# Patient Record
Sex: FEMALE | Race: WHITE | Employment: FULL TIME | URBAN - METROPOLITAN AREA
[De-identification: names, ages, dates, MRNs, and addresses within clinical notes are randomized per-mention and may not be internally consistent; named-entity substitution may affect disease eponyms.]

---

## 2017-04-05 ENCOUNTER — EMPLOYEE WELLNESS (OUTPATIENT)
Dept: OTHER | Age: 32
End: 2017-04-05

## 2017-04-05 LAB
CHOLESTEROL, TOTAL: 107 MG/DL (ref 0–199)
GLUCOSE BLD-MCNC: 82 MG/DL (ref 70–99)
HDLC SERPL-MCNC: 58 MG/DL (ref 40–60)
LDL CHOLESTEROL CALCULATED: 40 MG/DL
TRIGL SERPL-MCNC: 43 MG/DL (ref 0–150)

## 2017-10-13 ENCOUNTER — TELEPHONE (OUTPATIENT)
Dept: FAMILY MEDICINE CLINIC | Age: 32
End: 2017-10-13

## 2017-10-13 DIAGNOSIS — Z00.00 PHYSICAL EXAM: Primary | ICD-10-CM

## 2017-10-25 DIAGNOSIS — Z00.00 PHYSICAL EXAM: ICD-10-CM

## 2017-10-25 LAB
A/G RATIO: 2 (ref 1.1–2.2)
ALBUMIN SERPL-MCNC: 4.7 G/DL (ref 3.4–5)
ALP BLD-CCNC: 79 U/L (ref 40–129)
ALT SERPL-CCNC: 13 U/L (ref 10–40)
ANION GAP SERPL CALCULATED.3IONS-SCNC: 14 MMOL/L (ref 3–16)
AST SERPL-CCNC: 15 U/L (ref 15–37)
BASOPHILS ABSOLUTE: 0.1 K/UL (ref 0–0.2)
BASOPHILS RELATIVE PERCENT: 1.1 %
BILIRUB SERPL-MCNC: 0.7 MG/DL (ref 0–1)
BUN BLDV-MCNC: 14 MG/DL (ref 7–20)
CALCIUM SERPL-MCNC: 9.3 MG/DL (ref 8.3–10.6)
CHLORIDE BLD-SCNC: 100 MMOL/L (ref 99–110)
CHOLESTEROL, TOTAL: 109 MG/DL (ref 0–199)
CO2: 27 MMOL/L (ref 21–32)
CREAT SERPL-MCNC: 0.6 MG/DL (ref 0.6–1.1)
EOSINOPHILS ABSOLUTE: 0.4 K/UL (ref 0–0.6)
EOSINOPHILS RELATIVE PERCENT: 4.2 %
GFR AFRICAN AMERICAN: >60
GFR NON-AFRICAN AMERICAN: >60
GLOBULIN: 2.3 G/DL
GLUCOSE BLD-MCNC: 94 MG/DL (ref 70–99)
HCT VFR BLD CALC: 41.2 % (ref 36–48)
HDLC SERPL-MCNC: 52 MG/DL (ref 40–60)
HEMOGLOBIN: 13.5 G/DL (ref 12–16)
LDL CHOLESTEROL CALCULATED: 49 MG/DL
LYMPHOCYTES ABSOLUTE: 1.9 K/UL (ref 1–5.1)
LYMPHOCYTES RELATIVE PERCENT: 20.2 %
MCH RBC QN AUTO: 30.8 PG (ref 26–34)
MCHC RBC AUTO-ENTMCNC: 32.9 G/DL (ref 31–36)
MCV RBC AUTO: 93.8 FL (ref 80–100)
MONOCYTES ABSOLUTE: 0.5 K/UL (ref 0–1.3)
MONOCYTES RELATIVE PERCENT: 5.7 %
NEUTROPHILS ABSOLUTE: 6.4 K/UL (ref 1.7–7.7)
NEUTROPHILS RELATIVE PERCENT: 68.8 %
PDW BLD-RTO: 13.5 % (ref 12.4–15.4)
PLATELET # BLD: 337 K/UL (ref 135–450)
PMV BLD AUTO: 8.5 FL (ref 5–10.5)
POTASSIUM SERPL-SCNC: 4.2 MMOL/L (ref 3.5–5.1)
RBC # BLD: 4.39 M/UL (ref 4–5.2)
SODIUM BLD-SCNC: 141 MMOL/L (ref 136–145)
TOTAL PROTEIN: 7 G/DL (ref 6.4–8.2)
TRIGL SERPL-MCNC: 42 MG/DL (ref 0–150)
TSH SERPL DL<=0.05 MIU/L-ACNC: 1.94 UIU/ML (ref 0.27–4.2)
VLDLC SERPL CALC-MCNC: 8 MG/DL
WBC # BLD: 9.4 K/UL (ref 4–11)

## 2017-11-01 ENCOUNTER — OFFICE VISIT (OUTPATIENT)
Dept: FAMILY MEDICINE CLINIC | Age: 32
End: 2017-11-01

## 2017-11-01 VITALS
HEIGHT: 68 IN | SYSTOLIC BLOOD PRESSURE: 122 MMHG | WEIGHT: 124.6 LBS | RESPIRATION RATE: 14 BRPM | BODY MASS INDEX: 18.88 KG/M2 | DIASTOLIC BLOOD PRESSURE: 74 MMHG | HEART RATE: 104 BPM | OXYGEN SATURATION: 94 %

## 2017-11-01 DIAGNOSIS — Z00.00 ANNUAL PHYSICAL EXAM: Primary | ICD-10-CM

## 2017-11-01 DIAGNOSIS — R82.90 ABNORMAL URINALYSIS: ICD-10-CM

## 2017-11-01 LAB
BILIRUBIN, POC: NEGATIVE
BLOOD URINE, POC: NORMAL
CLARITY, POC: NORMAL
COLOR, POC: YELLOW
GLUCOSE URINE, POC: NEGATIVE
KETONES, POC: NEGATIVE
LEUKOCYTE EST, POC: NORMAL
NITRITE, POC: POSITIVE
PH, POC: 7
PROTEIN, POC: 30
SPECIFIC GRAVITY, POC: 1.02
UROBILINOGEN, POC: 1

## 2017-11-01 PROCEDURE — 81002 URINALYSIS NONAUTO W/O SCOPE: CPT | Performed by: FAMILY MEDICINE

## 2017-11-01 PROCEDURE — 99395 PREV VISIT EST AGE 18-39: CPT | Performed by: FAMILY MEDICINE

## 2017-11-01 ASSESSMENT — PATIENT HEALTH QUESTIONNAIRE - PHQ9
1. LITTLE INTEREST OR PLEASURE IN DOING THINGS: 0
SUM OF ALL RESPONSES TO PHQ9 QUESTIONS 1 & 2: 0
2. FEELING DOWN, DEPRESSED OR HOPELESS: 0
SUM OF ALL RESPONSES TO PHQ QUESTIONS 1-9: 0

## 2017-11-01 NOTE — PROGRESS NOTES
bloating, or abdominal pain  Genitourinary: no urinary urgency, frequency, dysuria, nocturia, hesitancy, or incontinence  Musculoskeletal: no arthritis, arthralgia, myalgia, weakness, or morning stiffness  Neurologic: no paralysis, paresis, paresthesia, seizures, tremors, or headaches  Hematologic/Lymphatic/Immunologic: no anemia, abnormal bleeding/bruising, fever, chills, night sweats, swollen glands, or unexplained weight loss  Endocrine: no heat or cold intolerance and no polyphagia, polydipsia, or polyuria    PHYSICAL EXAMINATION:  /74 (Site: Left Arm, Position: Sitting, Cuff Size: Small Adult)   Pulse 104   Resp 14   Ht 5' 7.5\" (1.715 m)   Wt 124 lb 9.6 oz (56.5 kg)   SpO2 94%   BMI 19.23 kg/m²   General appearance: healthy, alert, no distress  Skin: Skin color, texture, turgor normal. No rashes or suspicious lesions. No induration or tightening palpated. Head: Normocephalic. No masses, lesions, tenderness or abnormalities  Eyes: Conjunctivae/corneas clear. PERRL, EOM's intact. Ears: External ears normal. Canals clear. TM's clear bilaterally. Hearing grossly normal.  Nose/Sinuses: Nares normal. Septum midline. Mucosa normal. No drainage or sinus tenderness. Oropharynx: Lips, mucosa, and tongue normal. Teeth and gums normal. Oropharynx clear with no exudate seen. Neck: Neck supple, and symmetric. No adenopathy. Thyroid symmetric, normal size, without nodule. Trachea is midline. No carotid bruits were noted. Back: Back symmetric, no curvature. ROM normal. No CVA tenderness. Lungs: Good diaphragmatic excursion. Lungs clear to auscultation bilaterally. No retractions or use of accessory muscles. Heart: PMI is not displaced, and no thrill noted. Regular rate and rhythm, with no rub, murmur or gallop noted. Breasts: Exam deferred to OB/GYN  Abdomen: Abdomen soft, non-tender. BS normal. No masses, organomegaly. No hernia noted.   Extremities: Extremities normal. No deformities, edema, or skin discoloration. No cyanosis or clubbing noted to the nails. Hands and feet were warm and well-perfused with palpable dorsalis pedis pulses bilaterally. Lymph: No lymphadenopathy of the neck or supraclavicular regions. Musculoskeletal: Spine ROM normal. Muscular strength intact. Neuro: Cranial nerves intact, gait normal. Reflexes normal and symmetric, with no pathologic reflex noted. No focal weakness. Normal sensation to light touch. Pelvic: Exam deferred to OB/GYN      UA:Results for Waleska Abraham (MRN [id number]) as of 11/1/2017 11:06   Ref. Range 11/1/2017 11:03   Protein, UA Unknown 30   Color, UA Unknown yellow   Clarity, UA Unknown cloudy   Glucose, UA POC Unknown negative   Bilirubin, UA Unknown negative   Ketones, UA Unknown negative   Spec Grav, UA Unknown 1.020   pH, UA Unknown 7.0   Urobilinogen, UA Unknown 1.0   Nitrite, UA Unknown positive   Leukocytes, UA Unknown small   Blood, UA POC Unknown trace         ASSESSMENT:   Complete physical without pap. 1. Annual physical exam    2. Abnormal urinalysis          PLAN:   1. All health maintenance issues were updated. 2. Recommend continue current medications, continue current healthy lifestyle patterns and return for routine annual checkups  3. Urine sent for culture.

## 2017-11-03 LAB
ORGANISM: ABNORMAL
URINE CULTURE, ROUTINE: ABNORMAL
URINE CULTURE, ROUTINE: ABNORMAL

## 2017-11-03 RX ORDER — CIPROFLOXACIN 500 MG/1
500 TABLET, FILM COATED ORAL 2 TIMES DAILY
Qty: 6 TABLET | Refills: 0 | Status: SHIPPED | OUTPATIENT
Start: 2017-11-03 | End: 2017-11-06

## 2018-03-13 ENCOUNTER — OFFICE VISIT (OUTPATIENT)
Dept: DERMATOLOGY | Age: 33
End: 2018-03-13

## 2018-03-13 DIAGNOSIS — D22.9 MULTIPLE NEVI: Primary | ICD-10-CM

## 2018-03-13 DIAGNOSIS — D22.5 IRRITATED NEVUS OF BACK: ICD-10-CM

## 2018-03-13 DIAGNOSIS — B35.4 TINEA CORPORIS: ICD-10-CM

## 2018-03-13 DIAGNOSIS — Z86.018 HISTORY OF DYSPLASTIC NEVUS: ICD-10-CM

## 2018-03-13 PROCEDURE — 99214 OFFICE O/P EST MOD 30 MIN: CPT | Performed by: DERMATOLOGY

## 2018-03-13 PROCEDURE — 11300 SHAVE SKIN LESION 0.5 CM/<: CPT | Performed by: DERMATOLOGY

## 2018-03-13 NOTE — PATIENT INSTRUCTIONS
Biopsy Wound Care Instructions    · Keep the bandage in place for 24 hours. · Cleanse the wound with mild soapy water daily   Gently dry the area.  Apply Vaseline or petroleum jelly to the wound using a cotton tipped applicator.  Cover with a clean bandage.  Repeat this process until the biopsy site is healed.  If you had stitches placed, continue treating the site until the stitches are removed. Remember to make an appointment to return to have your stitches removed by our staff.  You may shower and bathe as usual.       ** Biopsy results generally take around 7 business days to come back. If you have not heard from us by then, please call the office at [phone] between 8AM and 4PM Monday through Friday. Protecting Yourself From the Sun    · Apply broad spectrum water resistant sunscreen with an SPF of at least 30 to exposed areas of the skin. Dont forget the ears and lips! Remember to reapply sunscreen about every 2 hours and after swimming or sweating. · Wear sun protective clothing. Swim shirts (aka. rash guards) are a great idea and negates the need to reapply sunscreen in those areas.      · Seek the shade whenever possible especially between the hours of 10 am and 4 pm when the suns rays are the strongest.     · Avoid tanning beds

## 2018-03-13 NOTE — PROGRESS NOTES
following were examined and determined to be abnormal: LUE, back  trunk and extremities with scattered brown macules and papules   Largest on the lower abdomen - pinkish-brown  Scar on the back clear  Central back at bra line with soft pinkish-tan 5 mm papule  L wrist with erythematous finely scaly round patch    Assessment and Plan     1. Benign-appearing nevi and history of dysplastic nevus  2. Family history of melanoma  - educ re ABCD's of MM   educ sun protection   encouraged skin check yearly (sooner if indicated), self checks    3. Irritated nevus - central back at bra line  - Shave removal - 5 mm - performed after verbal consent obtained. Patient educated regarding risk of bleeding, infection, scar and educated on wound care. Skin cleansed with alcohol pad and site anesthetized with lido + epi. Aluminum chloride applied to site for hemostasis. Petrolatum ointment and bandage applied. Specimen bottle labeled with patient information and site and specimen sent to dermpath.       4. Tinea corporis - L wrist - KOH +  - luzu daily x 10 days then HC cream if persistent dry patch after luzu completed

## 2018-03-20 VITALS — BODY MASS INDEX: 20.4 KG/M2 | WEIGHT: 117 LBS

## 2018-03-27 ENCOUNTER — TELEPHONE (OUTPATIENT)
Dept: DERMATOLOGY | Age: 33
End: 2018-03-27

## 2018-05-11 ENCOUNTER — EMPLOYEE WELLNESS (OUTPATIENT)
Dept: OTHER | Age: 33
End: 2018-05-11

## 2018-05-11 LAB
CHOLESTEROL, TOTAL: 106 MG/DL (ref 0–199)
GLUCOSE BLD-MCNC: 86 MG/DL (ref 70–99)
HDLC SERPL-MCNC: 51 MG/DL (ref 40–60)
LDL CHOLESTEROL CALCULATED: 47 MG/DL
TRIGL SERPL-MCNC: 39 MG/DL (ref 0–150)

## 2018-05-21 VITALS — WEIGHT: 125 LBS | BODY MASS INDEX: 19.29 KG/M2

## 2019-04-04 LAB — TSH SERPL DL<=0.05 MIU/L-ACNC: 2.3 UIU/ML (ref 0.27–4.2)

## 2019-04-11 ENCOUNTER — OFFICE VISIT (OUTPATIENT)
Dept: FAMILY MEDICINE CLINIC | Age: 34
End: 2019-04-11
Payer: COMMERCIAL

## 2019-04-11 VITALS
DIASTOLIC BLOOD PRESSURE: 64 MMHG | HEART RATE: 81 BPM | OXYGEN SATURATION: 95 % | WEIGHT: 123 LBS | SYSTOLIC BLOOD PRESSURE: 110 MMHG | RESPIRATION RATE: 15 BRPM | BODY MASS INDEX: 18.64 KG/M2 | HEIGHT: 68 IN

## 2019-04-11 DIAGNOSIS — E01.0 THYROMEGALY: ICD-10-CM

## 2019-04-11 DIAGNOSIS — E01.0 THYROMEGALY: Primary | ICD-10-CM

## 2019-04-11 LAB — THYROID PEROXIDASE (TPO) ABS: 7 IU/ML

## 2019-04-11 PROCEDURE — 99213 OFFICE O/P EST LOW 20 MIN: CPT | Performed by: FAMILY MEDICINE

## 2019-04-11 ASSESSMENT — PATIENT HEALTH QUESTIONNAIRE - PHQ9
SUM OF ALL RESPONSES TO PHQ QUESTIONS 1-9: 0
2. FEELING DOWN, DEPRESSED OR HOPELESS: 0
1. LITTLE INTEREST OR PLEASURE IN DOING THINGS: 0
SUM OF ALL RESPONSES TO PHQ9 QUESTIONS 1 & 2: 0
SUM OF ALL RESPONSES TO PHQ QUESTIONS 1-9: 0

## 2019-04-11 NOTE — PROGRESS NOTES
Melina Sanchez is a 27 y.o. female. HPI:  Saw gyn last week, they noted her thyroid was enlarged. Pt had not noticed. Denies pain. No trouble breathing/swallowing. Denies symptoms of hypo/hyperthyroidism. Had TSH drawn, was normal.     ROS:  Gen:  Denies fever, chills, headaches. HEENT:  Denies cold symptoms, sore throat. CV:  Denies chest pain or tightness, palpitations. Pulm:  Denies shortness of breath, cough. Abd:  Denies abdominal pain, change in bowel habits. I have reviewed the patient's medical/surgical/family/social in detail and updated the computerized patient record as appropriate. No current outpatient medications on file. No current facility-administered medications for this visit.         Past Medical History:   Diagnosis Date    Heart abnormalities age 8    benign heart murmur    Varicosities     vulvar variscosities     Past Surgical History:   Procedure Laterality Date    WISDOM TOOTH EXTRACTION  age 12     Family History   Problem Relation Age of Onset    High Cholesterol Paternal Uncle     Cancer Maternal Grandmother 80        colon    High Blood Pressure Maternal Grandmother     High Cholesterol Maternal Grandmother     Diabetes Maternal Grandmother     Parkinsonism Maternal Grandmother     Other Mother         mental illness    Cancer Maternal Grandfather         BCC & SCC per PT     Social History     Socioeconomic History    Marital status:      Spouse name: Not on file    Number of children: 3    Years of education: Not on file    Highest education level: Not on file   Occupational History    Occupation: Nurse     Comment: [employer]   Social Needs    Financial resource strain: Not on file    Food insecurity:     Worry: Not on file     Inability: Not on file    Transportation needs:     Medical: Not on file     Non-medical: Not on file   Tobacco Use    Smoking status: Never Smoker    Smokeless tobacco: Never Used   Substance and Sexual Activity  Alcohol use: No    Drug use: No    Sexual activity: Yes     Partners: Male     Birth control/protection: IUD   Lifestyle    Physical activity:     Days per week: Not on file     Minutes per session: Not on file    Stress: Not on file   Relationships    Social connections:     Talks on phone: Not on file     Gets together: Not on file     Attends Mandaen service: Not on file     Active member of club or organization: Not on file     Attends meetings of clubs or organizations: Not on file     Relationship status: Not on file    Intimate partner violence:     Fear of current or ex partner: Not on file     Emotionally abused: Not on file     Physically abused: Not on file     Forced sexual activity: Not on file   Other Topics Concern    Not on file   Social History Narrative    Not on file         OBJECTIVE:  /64 (Site: Right Upper Arm, Position: Sitting, Cuff Size: Small Adult)   Pulse 81   Resp 15   Ht 5' 7.5\" (1.715 m)   Wt 123 lb (55.8 kg)   SpO2 95%   BMI 18.98 kg/m²   GEN:  WDWN, NAD  HEENT:  NCAT, PERRL, EOMI. NECK:  Supple without adenopathy. Thyroid diffusely enlarged, nontender. CV:  Regular rate and rhythm, S1 and S2 normal, no murmurs, clicks, gallops or rubs. No edema. PULM:  Chest is clear, no wheezing or rales. Normal symmetric air entry throughout both lung fields. PSYCH: normal mood and affect. Intact judgement and insight  NEURO: A&O x 3    ASSESSMENT/PLAN:  1. Thyromegaly  - US THYROID; Future  - THYROID PEROXIDASE ANTIBODY;  Future

## 2019-04-15 ENCOUNTER — HOSPITAL ENCOUNTER (OUTPATIENT)
Dept: ULTRASOUND IMAGING | Age: 34
Discharge: HOME | End: 2019-04-15
Payer: COMMERCIAL

## 2019-04-15 DIAGNOSIS — E04.1 THYROID NODULE: Primary | ICD-10-CM

## 2019-04-15 DIAGNOSIS — E01.0 THYROMEGALY: ICD-10-CM

## 2019-04-15 PROCEDURE — 76536 US EXAM OF HEAD AND NECK: CPT

## 2019-04-25 ENCOUNTER — HOSPITAL ENCOUNTER (OUTPATIENT)
Dept: ULTRASOUND IMAGING | Age: 34
Discharge: HOME | End: 2019-04-25
Payer: COMMERCIAL

## 2019-04-25 DIAGNOSIS — E04.1 THYROID NODULE: ICD-10-CM

## 2019-04-25 PROCEDURE — 60100 BIOPSY OF THYROID: CPT

## 2019-04-25 PROCEDURE — 88305 TISSUE EXAM BY PATHOLOGIST: CPT

## 2019-04-25 PROCEDURE — 2709999900 US FINE NEEDLE ASPIRATION

## 2019-04-29 ENCOUNTER — TELEPHONE (OUTPATIENT)
Dept: FAMILY MEDICINE CLINIC | Age: 34
End: 2019-04-29

## 2019-04-29 NOTE — TELEPHONE ENCOUNTER
Pt would like for Dr Caren Ward to call her to discuss findings in regards to thyroid biopsy.   Please call back and advise

## 2019-05-15 ENCOUNTER — TELEPHONE (OUTPATIENT)
Dept: DERMATOLOGY | Age: 34
End: 2019-05-15

## 2019-05-15 NOTE — TELEPHONE ENCOUNTER
Patient scheduled to have rash on forearm looked at. Patient has treated the area as ringworm without relief and has also tried HTC, no relief.      Patient last OV 2018:Tinea corporis - L wrist - KOH +  - luzu daily x 10 days then HC cream if persistent dry patch after luzu completed

## 2019-05-16 ENCOUNTER — OFFICE VISIT (OUTPATIENT)
Dept: DERMATOLOGY | Age: 34
End: 2019-05-16
Payer: COMMERCIAL

## 2019-05-16 DIAGNOSIS — L30.9 DERMATITIS: Primary | ICD-10-CM

## 2019-05-16 PROCEDURE — 99213 OFFICE O/P EST LOW 20 MIN: CPT | Performed by: DERMATOLOGY

## 2019-05-16 NOTE — PROGRESS NOTES
[facility]  Marco Madrigal MD  [phone]      Angel French  1985    27 y.o. female     Date of Visit: 5/16/2019    Chief Complaint: rash   Chief Complaint   Patient presents with    Rash     lt wrist-goes through phases since being treated for tinea     Last seen: 3-2018    History of Present Illness:    1. Here for rash on the L wrist - present since spring 2018. Itchy. Luzu rx'd for KAYE + tinea last year and didn't seem to clear much; has tried OTC antifungal cream and seems to wax/wane. She wears a watch/fitbit here but has tried switching it to the R hand - no eruption on the R and L has not gotten better. No personal history of skin cancer. Grandfather with hx of BCC and melanoma. She had a dysplastic nevus on the back removed as a teenager. She burns easily. No tanning beds. She wears sunscreen regularly but doesn't always reapply if she is out. Review of Systems:  Gen: Feels well, good sense of health. Skin: No other new rashes. Past Medical History, Family History, Surgical History, Medications and Allergies reviewed. She has an almost 2 yo, a 10 yo daughter and a 7 yo son and 6.8 yo daughter.     Past Medical History:   Diagnosis Date    Heart abnormalities age 8    benign heart murmur    Thyroid nodule 4/15/2019    Varicosities     vulvar variscosities       Past Surgical History:   Procedure Laterality Date    ME SONO GUIDE NEEDLE BIOPSY  04/2019    benign    WISDOM TOOTH EXTRACTION  age 12       No outpatient medications have been marked as taking for the 5/16/19 encounter (Office Visit) with Yany Trejo MD.       No Known Allergies    Physical Examination     Gen, well-appearing  The following were examined and determined to be normal: Psych/Neuro, Head/face, RUE, fingers/nails   The following were examined and determined to be abnormal: LUE    R wrist/hand clear  L wrist with erythematous mildly lichenified ill-defined patch/thin plaque with few excoriations    Assessment and Plan     1.  C/w Dermatitis - L wrist, KOH neg today  - TAC oint bid; ed se/misuse  - rtn for bx if not clearing

## 2020-08-11 ENCOUNTER — EMPLOYEE WELLNESS (OUTPATIENT)
Dept: OTHER | Age: 35
End: 2020-08-11

## 2020-08-12 LAB
CHOLESTEROL, TOTAL: 134 MG/DL (ref 0–199)
GLUCOSE BLD-MCNC: 79 MG/DL (ref 70–99)
HDLC SERPL-MCNC: 63 MG/DL (ref 40–60)
LDL CHOLESTEROL CALCULATED: 61 MG/DL
TRIGL SERPL-MCNC: 52 MG/DL (ref 0–150)

## 2020-10-19 VITALS — WEIGHT: 129 LBS | BODY MASS INDEX: 19.91 KG/M2

## 2020-11-16 LAB
ORGANISM: ABNORMAL
URINE CULTURE, ROUTINE: ABNORMAL

## 2020-11-19 LAB
HPV COMMENT: NORMAL
HPV TYPE 16: NOT DETECTED
HPV TYPE 18: NOT DETECTED
HPVOH (OTHER TYPES): NOT DETECTED

## 2020-11-20 ENCOUNTER — NURSE TRIAGE (OUTPATIENT)
Dept: OTHER | Facility: CLINIC | Age: 35
End: 2020-11-20

## 2020-11-20 ENCOUNTER — VIRTUAL VISIT (OUTPATIENT)
Dept: FAMILY MEDICINE CLINIC | Age: 35
End: 2020-11-20
Payer: COMMERCIAL

## 2020-11-20 PROCEDURE — 99213 OFFICE O/P EST LOW 20 MIN: CPT | Performed by: NURSE PRACTITIONER

## 2020-11-20 ASSESSMENT — ENCOUNTER SYMPTOMS
COUGH: 1
WHEEZING: 0
CHEST TIGHTNESS: 0
SHORTNESS OF BREATH: 0
GASTROINTESTINAL NEGATIVE: 1

## 2020-11-20 ASSESSMENT — PATIENT HEALTH QUESTIONNAIRE - PHQ9
SUM OF ALL RESPONSES TO PHQ QUESTIONS 1-9: 0
2. FEELING DOWN, DEPRESSED OR HOPELESS: 0
SUM OF ALL RESPONSES TO PHQ9 QUESTIONS 1 & 2: 0
1. LITTLE INTEREST OR PLEASURE IN DOING THINGS: 0

## 2020-11-20 NOTE — TELEPHONE ENCOUNTER
Please help schedule VV appt- can do overflow pool today if needed or with Sienna Fragoso at [place] if she has availability.

## 2020-11-20 NOTE — LETTER
NOTIFICATION RETURN TO WORK / SCHOOL              11/20/2020    [address] [place] [id number]      To Whom It May Concern:    Zita Ragland is being tested for Covid on 11/23/2020. She is currently having symptoms and has been exposed to her  who is currently Covid +. She should quarantine in her home for 10 days after symptoms started which was 11/18/2020. After which, she may return to work as long as she has been fever free for 24 hours and her symptoms are improving. If there are questions or concerns, please have the patient contact our office.     Sincerely,          Jv Catalan, ROSA - CNP

## 2020-11-20 NOTE — TELEPHONE ENCOUNTER
Employee calling to discuss ill symptoms in regards to working during the [employer] [employer] emergency, onset of symptoms 11/18/20, see triage assessment below. Care Advice provided; patient acknowledged understanding of care advice and is in agreement with plan. Patient has no further questions; instructed to call back for any new or worsening symptoms. [employer] form filled out. Employee's manager e mailed. Number given, then transferred to [facility]- [phone]    Reason for Disposition   [1] COVID-19 infection suspected by caller or triager AND [2] mild symptoms (cough, fever, or others) AND [7] no complications or SOB    Answer Assessment - Initial Assessment Questions  1. COVID-19 DIAGNOSIS: \"Who made your Coronavirus (COVID-19) diagnosis? \" \"Was it confirmed by a positive lab test?\" If not diagnosed by a HCP, ask \"Are there lots of cases (community spread) where you live? \" (See public health department website, if unsure)     Patient states she was tested at the [facility] [place] [place] on 11/19/20; rapid results negative    2. ONSET: \"When did the COVID-19 symptoms start?\"      11/19/20    3. WORST SYMPTOM: \"What is your worst symptom? \" (e.g., cough, fever, shortness of breath, muscle aches)     Chills    4. COUGH: \"Do you have a cough? \" If so, ask: \"How bad is the cough? \"        Mild dry cough    5. FEVER: \"Do you have a fever? \" If so, ask: \"What is your temperature, how was it measured, and when did it start? \"      No recorded temp but has chills    6. RESPIRATORY STATUS: \"Describe your breathing? \" (e.g., shortness of breath, wheezing, unable to speak)       Normal    7. BETTER-SAME-WORSE: Renetta Cordial you getting better, staying the same or getting worse compared to yesterday? \"  If getting worse, ask, \"In what way? \"      Worse - chills    8. HIGH RISK DISEASE: \"Do you have any chronic medical problems? \" (e.g., asthma, heart or lung disease, weak immune system, etc.)      Denies high risk medical conditions    9. PREGNANCY: \"Is there any chance you are pregnant? \" \"When was your last menstrual period? \"      No. LMP - Does not have them; IUD    10. OTHER SYMPTOMS: \"Do you have any other symptoms? \"  (e.g., chills, fatigue, headache, loss of smell or taste, muscle pain, sore throat)       Chills, mild headache, sinus congestion, clear runny nose    Protocols used: CORONAVIRUS (COVID-19) DIAGNOSED OR SUSPECTED-ADULT-     Attention Provider: Thank you for allowing me to participate in the care of your patient. Please do not respond through this encounter as the response is not directed to a shared pool.

## 2020-11-20 NOTE — PROGRESS NOTES
Medical History:   Diagnosis Date    Heart abnormalities age 8    benign heart murmur    Thyroid nodule 4/15/2019    Varicosities     vulvar variscosities       Past Surgical History:   Procedure Laterality Date    [place] SONO GUIDE NEEDLE BIOPSY  04/2019    benign    WISDOM TOOTH EXTRACTION  age 12       Family History   Problem Relation Age of Onset    High Cholesterol Paternal Uncle     Cancer Maternal Grandmother 80        colon    High Blood Pressure Maternal Grandmother     High Cholesterol Maternal Grandmother     Diabetes Maternal Grandmother     Parkinsonism Maternal Grandmother     Other Mother         mental illness    Cancer Maternal Grandfather         BCC & SCC per PT       No Known Allergies    Social History     Tobacco Use    Smoking status: Never Smoker    Smokeless tobacco: Never Used   Substance Use Topics    Alcohol use: No    Drug use: No      PHYSICAL EXAMINATION:  Vital Signs: (As obtained by patient/caregiver or practitioner observation)  There were no vitals taken for this visit. Respiratory rate appears normal at 16/minute  Constitutional: Appears well-developed and well-nourished. No apparent distress    Mental status: Alert and awake. Oriented to person/place/kiana. Able to follow commands    Eyes: EOM normal. Sclera normal. No discharge visible  HENT: Normocephalic, atraumatic. Mouth/Throat: Mucous membranes are moist. External Ears Normal    Neck: No visualized mass   Pulmonary/Chest: Respiratory effort normal.  No visualized signs of difficulty breathing or respiratory distress        Musculoskeletal:  Normal range of motion of neck  Neurological:       No Facial Asymmetry (Cranial nerve 7 motor function) (limited exam to video visit). No gaze palsy       Skin:  No significant exanthematous lesions or discoloration noted on facial skin       Psychiatric: Normal Affect. No Hallucinations          ASSESSMENT/PLAN:  1.  Exposure to COVID-19 virus  Would like patient to have synchronous discussion virtually to substitute for in-person clinic visit. Patient and provider were located at their individual homes. --ROSA Valdes CNP on 11/20/2020 at 5:41 PM    An electronic signature was used to authenticate this note. Swathi Wiseman

## 2021-05-27 ENCOUNTER — OFFICE VISIT (OUTPATIENT)
Dept: DERMATOLOGY | Age: 36
End: 2021-05-27
Payer: COMMERCIAL

## 2021-05-27 VITALS — TEMPERATURE: 99.5 F

## 2021-05-27 DIAGNOSIS — D48.5 NEOPLASM OF UNCERTAIN BEHAVIOR OF SKIN: ICD-10-CM

## 2021-05-27 DIAGNOSIS — D22.9 MULTIPLE NEVI: Primary | ICD-10-CM

## 2021-05-27 DIAGNOSIS — Z80.8 FAMILY HISTORY OF MELANOMA: ICD-10-CM

## 2021-05-27 DIAGNOSIS — L70.0 ACNE VULGARIS: ICD-10-CM

## 2021-05-27 PROCEDURE — 99214 OFFICE O/P EST MOD 30 MIN: CPT | Performed by: DERMATOLOGY

## 2021-05-27 PROCEDURE — 11102 TANGNTL BX SKIN SINGLE LES: CPT | Performed by: DERMATOLOGY

## 2021-05-27 NOTE — PROGRESS NOTES
MD Roseline.       No Known Allergies    Physical Examination     Gen, well-appearing    Skin exam performed except for pants covered area-lower extremities and feet. trunk and extremities with scattered brown macules and papules   Largest on the lower abdomen - pinkish-brown with central soft tan papule  Scar on the back clear  Jawline and lower cheek/chin with scattered erythematous papules and macules    Assessment and Plan     1. Benign-appearing nevi and history of dysplastic nevus  2. Family history of melanoma  - educ re ABCD's of MM   educ sun protection   encouraged skin check yearly (sooner if indicated), self checks    3. R/o Irritated nevus - abdomen  - Shave bx performed after verbal consent obtained. Patient educated regarding risk of bleeding, infection, scar and educated on wound care. Skin cleansed with alcohol pad and site anesthetized with lido + epi. Aluminum chloride applied to site for hemostasis. Petrolatum ointment and bandage applied. Specimen bottle labeled with patient information and site and specimen sent to dermpath.       4. Acne - jawline/hormonal - flaring  -Start BP wash-CeraVe foaming acne wash sample-discussed risk of bleaching and irritation  -Start Clindagel daily to twice daily as needed flares for affected areas  -Call if worsening and can consider other treatment

## 2021-06-01 LAB — DERMATOLOGY PATHOLOGY REPORT: NORMAL

## 2021-09-10 LAB
CHOLESTEROL, TOTAL: 131 MG/DL (ref 0–199)
GLUCOSE BLD-MCNC: 85 MG/DL (ref 70–99)
HDLC SERPL-MCNC: 54 MG/DL (ref 40–60)
LDL CHOLESTEROL CALCULATED: 68 MG/DL
TRIGL SERPL-MCNC: 47 MG/DL (ref 0–150)

## 2021-10-12 ENCOUNTER — OFFICE VISIT (OUTPATIENT)
Dept: ENT CLINIC | Age: 36
End: 2021-10-12
Payer: COMMERCIAL

## 2021-10-12 ENCOUNTER — E-VISIT (OUTPATIENT)
Dept: OTHER | Facility: CLINIC | Age: 36
End: 2021-10-12
Payer: COMMERCIAL

## 2021-10-12 VITALS
HEIGHT: 63 IN | HEART RATE: 81 BPM | BODY MASS INDEX: 23.5 KG/M2 | TEMPERATURE: 96.6 F | DIASTOLIC BLOOD PRESSURE: 79 MMHG | WEIGHT: 132.6 LBS | SYSTOLIC BLOOD PRESSURE: 132 MMHG

## 2021-10-12 DIAGNOSIS — E04.1 THYROID NODULE: Primary | ICD-10-CM

## 2021-10-12 DIAGNOSIS — R39.9 UTI SYMPTOMS: Primary | ICD-10-CM

## 2021-10-12 PROCEDURE — 99421 OL DIG E/M SVC 5-10 MIN: CPT | Performed by: FAMILY MEDICINE

## 2021-10-12 PROCEDURE — 76536 US EXAM OF HEAD AND NECK: CPT | Performed by: OTOLARYNGOLOGY

## 2021-10-12 PROCEDURE — G8420 CALC BMI NORM PARAMETERS: HCPCS | Performed by: OTOLARYNGOLOGY

## 2021-10-12 PROCEDURE — G8427 DOCREV CUR MEDS BY ELIG CLIN: HCPCS | Performed by: OTOLARYNGOLOGY

## 2021-10-12 PROCEDURE — G8484 FLU IMMUNIZE NO ADMIN: HCPCS | Performed by: OTOLARYNGOLOGY

## 2021-10-12 PROCEDURE — 99244 OFF/OP CNSLTJ NEW/EST MOD 40: CPT | Performed by: OTOLARYNGOLOGY

## 2021-10-12 ASSESSMENT — ENCOUNTER SYMPTOMS
COUGH: 0
CONSTIPATION: 0
SHORTNESS OF BREATH: 0
CHOKING: 0
VOMITING: 0
APNEA: 0
BLOOD IN STOOL: 0
RHINORRHEA: 0
TROUBLE SWALLOWING: 0
EYE DISCHARGE: 0
COLOR CHANGE: 0
WHEEZING: 0
CHEST TIGHTNESS: 0
EYE PAIN: 0
BACK PAIN: 0
STRIDOR: 0
VOICE CHANGE: 0
SINUS PAIN: 0
SORE THROAT: 0
DIARRHEA: 0
FACIAL SWELLING: 0
NAUSEA: 0
SINUS PRESSURE: 0

## 2021-10-12 NOTE — LETTER
[facility]    Surgery Schedule Request Form: 10/12/21  [address]. [address] [place] [address] Av      DATE OF SURGERY: ###    TIME OF SURGERY:  ###            CONF #: ____________________       Patient Information:    Patient name: Trevor Wiseman    YOB: 1985 Age/Sex:36 y.o./female    SS #:[id number]    Wt Readings from Last 1 Encounters:   10/12/21 132 lb 9.6 oz (60.1 kg)       Telephone Information:   Mobile [phone]     Home [phone]     Surgeon & Procedure Information:     Lead surgeon: Celeste Anne Co-Surgeon: elizabeth  Phone: [phone] Fax: [phone]  PCP: Gregg Good MD    Diagnosis: left thyroid nodule    Procedure name/CPT: Left hemithyroidectomy 34837    Procedure length: 90 minutes Anesthesia: General    Special Equipment: yes - Laryngeal nerve monitor    Patient Status: SDS (OP)    Primary Payor Plan: ###  Member ID: ###   Subscriber name: ###    [] Implement attached clinical orders for patient.       Electronically signed by Les Snyder MD on 10/12/2021 at 8:47 AM

## 2021-10-12 NOTE — PROGRESS NOTES
Subjective:      Patient ID: Nisha High is a 39 y.o. female. HPI    History of Present Illness  Head/Neck    CC: thyroid nodule    Comments: Gwen Canales is a(n) 39 y.o. female who presents with a thyroid nodule. Found 2 years ago by OB/GYN. FNA benign. Increasing in size. Effecting neck motion, noticeable and effecting swallowing.    Pain no  Location: Left neck  Onset:  2 years  Timing: Progressive  Otalgia:  no  Odynophagia:  no  Dysphagia:  large food bolus only  Dyspnea:  none  Voice Changes:  no clear  Lumps in neck: Yes  Hemoptysis:  no  Fever: no  Chills:  no  Sweats:  no  Weight Loss:  stable / unchanged  Modifying Factors:  Family history of thyroid disease Yes, Graves     Family history of thyroid cancer No     Personal history of neck radiation No  Patient denies the following symptoms: fatigue, weight gain, weight loss, cold intolerance, heat intolerance, hair loss, dry skin, constipation, diarrhea, edema, anxiety, tremor and palpitations        Lab Studies:  Lab Results   Component Value Date    WBC 9.4 10/25/2017    HGB 13.5 10/25/2017    HCT 41.2 10/25/2017    MCV 93.8 10/25/2017     10/25/2017     Lab Results   Component Value Date    GLUCOSE 85 09/10/2021    BUN 14 10/25/2017    CREATININE 0.6 10/25/2017    K 4.2 10/25/2017     10/25/2017     10/25/2017    CALCIUM 9.3 10/25/2017     No results found for: MG  No results found for: PHOS  Lab Results   Component Value Date    ALKPHOS 79 10/25/2017    ALT 13 10/25/2017    AST 15 10/25/2017    BILITOT 0.7 10/25/2017    PROT 7.0 10/25/2017     Lab Results   Component Value Date    TSH 2.30 04/04/2019       Patient Active Problem List   Diagnosis    Thyroid nodule     Past Surgical History:   Procedure Laterality Date    [place] SONO GUIDE NEEDLE BIOPSY  04/2019    benign    WISDOM TOOTH EXTRACTION  age 12     Family History   Problem Relation Age of Onset    High Cholesterol Paternal Uncle     Cancer Maternal Grandmother 80        colon  High Blood Pressure Maternal Grandmother     High Cholesterol Maternal Grandmother     Diabetes Maternal Grandmother     Parkinsonism Maternal Grandmother     Other Mother         mental illness    Cancer Maternal Grandfather         BCC & SCC per PT     Social History     Socioeconomic History    Marital status:      Spouse name: Not on file    Number of children: 3    Years of education: Not on file    Highest education level: Not on file   Occupational History    Occupation: Nurse     Comment: [employer]   Tobacco Use    Smoking status: Never Smoker    Smokeless tobacco: Never Used   Substance and Sexual Activity    Alcohol use: No    Drug use: No    Sexual activity: Yes     Partners: Male     Birth control/protection: I.U.D. Other Topics Concern    Not on file   Social History Narrative    Not on file     Social Determinants of Health     Financial Resource Strain:     Difficulty of Paying Living Expenses:    Food Insecurity:     Worried About Running Out of Food in the Last Year:     [address] in the Last Year:    Transportation Needs:     Lack of Transportation (Medical):  Lack of Transportation (Non-Medical):    Physical Activity:     Days of Exercise per Week:     Minutes of Exercise per Session:    Stress:     Feeling of Stress :    Social Connections:     Frequency of Communication with Friends and Family:     Frequency of Social Gatherings with Friends and Family:     Attends Mandaen Services:     Active Member of Clubs or Organizations:     Attends Club or Organization Meetings:     Marital Status:    Intimate Partner Violence:     Fear of Current or Ex-Partner:     Emotionally Abused:     Physically Abused:     Sexually Abused:        DRUG/FOOD ALLERGIES: Patient has no known allergies.     CURRENT MEDICATIONS  Prior to Admission medications    Not on File     Review of Systems   Constitutional: Negative for activity change, appetite change, chills, fatigue and fever. HENT: Negative for congestion, dental problem, drooling, ear discharge, ear pain, facial swelling, hearing loss, mouth sores, nosebleeds, postnasal drip, rhinorrhea, sinus pressure, sinus pain, sneezing, sore throat, tinnitus, trouble swallowing and voice change. Eyes: Negative for pain, discharge and visual disturbance. Respiratory: Negative for apnea, cough, choking, chest tightness, shortness of breath, wheezing and stridor. Cardiovascular: Negative for palpitations. Gastrointestinal: Negative for blood in stool, constipation, diarrhea, nausea and vomiting. Endocrine: Negative for cold intolerance, heat intolerance, polydipsia, polyphagia and polyuria. Musculoskeletal: Negative for back pain, gait problem, neck pain and neck stiffness. Skin: Negative for color change, pallor, rash and wound. Allergic/Immunologic: Negative for environmental allergies, food allergies and immunocompromised state. Neurological: Negative for dizziness, facial asymmetry, speech difficulty, light-headedness, numbness and headaches. Hematological: Negative for adenopathy. Does not bruise/bleed easily. Psychiatric/Behavioral: Negative for agitation, confusion, self-injury and sleep disturbance. The patient is not nervous/anxious. Objective:   Physical Exam  Constitutional:       Appearance: She is well-developed. She is not ill-appearing. HENT:      Head: Normocephalic and atraumatic. Not macrocephalic and not microcephalic. No raccoon eyes, Fitzgerald's sign, abrasion, contusion, right periorbital erythema, left periorbital erythema or laceration. Hair is normal.      Jaw: No trismus. Salivary Glands: Right salivary gland is not diffusely enlarged. Left salivary gland is not diffusely enlarged. Right Ear: Hearing, tympanic membrane and external ear normal. No decreased hearing noted. No drainage, swelling or tenderness. No middle ear effusion. No mastoid tenderness. Tympanic membrane is not perforated, retracted or bulging. Tympanic membrane has normal mobility. Left Ear: Hearing, tympanic membrane and external ear normal. No decreased hearing noted. No drainage, swelling or tenderness. No middle ear effusion. No mastoid tenderness. Tympanic membrane is not perforated, retracted or bulging. Tympanic membrane has normal mobility. Ears:      Rizzo exam findings: does not lateralize. Right Rinne: AC > BC. Left Rinne: AC > BC. Nose: No nasal deformity, septal deviation, laceration, mucosal edema or rhinorrhea. Right Nostril: No epistaxis. Left Nostril: No epistaxis. Right Turbinates: Not enlarged. Left Turbinates: Not enlarged. Right Sinus: No maxillary sinus tenderness or frontal sinus tenderness. Left Sinus: No maxillary sinus tenderness or frontal sinus tenderness. Mouth/Throat:      Lips: No lesions. Mouth: Mucous membranes are not pale, not dry and not cyanotic. No lacerations or oral lesions. Dentition: Normal dentition. No dental caries or dental abscesses. Tongue: No lesions. Palate: No mass. Pharynx: Uvula midline. No oropharyngeal exudate, posterior oropharyngeal erythema or uvula swelling. Tonsils: No tonsillar abscesses. Eyes:      General: Lids are normal. Lids are everted, no foreign bodies appreciated. Right eye: No discharge. Left eye: No discharge. Extraocular Movements:      Right eye: Normal extraocular motion and no nystagmus. Left eye: Normal extraocular motion and no nystagmus. Conjunctiva/sclera:      Right eye: No chemosis or exudate. Left eye: No chemosis or exudate. Neck:      Thyroid: No thyroid mass or thyromegaly. Vascular: Normal carotid pulses. Trachea: Trachea normal. No tracheal deviation. Cardiovascular:      Rate and Rhythm: Normal rate and regular rhythm.    Pulmonary:      Effort: No tachypnea, bradypnea or respiratory distress. Breath sounds: No stridor. Musculoskeletal:      Right shoulder: Normal range of motion. Left shoulder: Normal range of motion. Cervical back: Neck supple. Lymphadenopathy:      Head:      Right side of head: No submental, submandibular, tonsillar, preauricular, posterior auricular or occipital adenopathy. Left side of head: No submental, submandibular, tonsillar, preauricular, posterior auricular or occipital adenopathy. Cervical:      Right cervical: No superficial, deep or posterior cervical adenopathy. Left cervical: No superficial, deep or posterior cervical adenopathy. Skin:     Findings: No bruising, erythema, laceration or lesion. Neurological:      Mental Status: She is alert and oriented to person, place, and time. Psychiatric:         Speech: Speech normal.         Behavior: Behavior normal.       Narrative   Thyroid ultrasound.       HISTORY: Thyromegaly.       The right lobe measures 4.6 x 1.8 x 1.3 cm       The left lobe measures 5.3 x 2.2 x 1.7 cm       The isthmus measures 2 mm       Right lobe nodules: Solid oval well-circumscribed nodule inferior pole 4 x 4 x 3 mm. Solid oval well-circumscribed nodule superior pole 5 x 4 x 3 mm.       Left lobe nodules: Oval solid mildly hyperechoic nodule with flow inferior pole 3.7 x 3.3 x 2.3 cm indeterminate. Ultrasound-guided biopsy recommended.           Impression       Dominant solid nodule left lobe inferior pole 3.7 cm indeterminate. Neoplasm is not excluded. Ultrasound-guided biopsy recommended.       Small subcentimeter nodules right lobe likely benign though indeterminate. Follow-up in 6 months to one year recommended.         NECK ULTRASOUND    Pre-op Diagnosis: left thyroid nodule  Anesthesia: None  Complications: none  Estimated Blood Loss: none  Indications: pre-op sizing  Procedure: neck US    The patient was placed in a semi-recumbent position with mild neck extension.  Real time B-mode ultrasound on the neck was performed in transverse/ axial and longitudinal/ sagittal planes. Findings:   Left lobe: 2.1x2.5x5.2cm  Right Lobe: 1.3x1.1x5.2cm  Isthmus: 3mm    Nodules/Cysts: 4.2 cm solid nodule left     Ultrasound guided fine needle aspiration was not performed. The patient tolerated the procedure well and without side effects. I attest that I was present for and did the entire procedure myself. Assessment:       Diagnosis Orders   1. Thyroid nodule             Plan:      OR for left hemithyroidectomy    The patient has a diagnosis of left thyroid nodule which has not been responsive or cannot be treated with maximal medical therapy. The patient has been advised of options including further medical therapy, surgery, or nothing. The risks and benefits of surgery were described not limited to infection, bleeding, airway and pharyngeal fistula, scars including hypertrophic and keloids, recurrence of disease  and need for further surgical management of disease. Specific thyroid risks include permanent or temporary vocal cord injury, hoarseness, and possible need for calcium and vitamin D replacement which may be permanent. Patient expectations during and after surgery including post-operative care and pain control were described. Questions were answered and the patient agreed to proceed with surgery which will be scheduled in an appropriate time frame in line with the severity of disease. The patient was counseled at length about the risks of gera Covid-19 in the svetlana-operative and post-operative states including the recovery window of their procedure. The patient was made aware that gera Covid-19 after a surgical procedure may worsen their prognosis for recovering from the virus and lend to a higher morbidity and or mortality risk. The patient was given the options of postponing their procedure. All of the risks, benefits, and alternatives were discussed.  The patient does wish to proceed with the procedure.                Estevan Alcantar MD

## 2021-10-12 NOTE — LETTER
[facility]  [address]  Phone: [phone]  Fax: [phone]    Arelis Baker MD    October 12, 2021     Adilson Tabares MD  [address]    Patient: Maldonado Bullard   MR Number: [id number]   YOB: 1985   Date of Visit: 10/12/2021       Dear Adilson Tabares:    Thank you for referring Maldonado Bullard to me for evaluation/treatment. Below are the relevant portions of my assessment and plan of care. Diagnosis Orders   1. Thyroid nodule           OR for left hemithyroidectomy    The patient has a diagnosis of left thyroid nodule which has not been responsive or cannot be treated with maximal medical therapy. The patient has been advised of options including further medical therapy, surgery, or nothing. The risks and benefits of surgery were described not limited to infection, bleeding, airway and pharyngeal fistula, scars including hypertrophic and keloids, recurrence of disease  and need for further surgical management of disease. Specific thyroid risks include permanent or temporary vocal cord injury, hoarseness, and possible need for calcium and vitamin D replacement which may be permanent. Patient expectations during and after surgery including post-operative care and pain control were described. Questions were answered and the patient agreed to proceed with surgery which will be scheduled in an appropriate time frame in line with the severity of disease. The patient was counseled at length about the risks of gera Covid-19 in the svetlana-operative and post-operative states including the recovery window of their procedure. The patient was made aware that gera Covid-19 after a surgical procedure may worsen their prognosis for recovering from the virus and lend to a higher morbidity and or mortality risk.   The patient was given the options of postponing their procedure. All of the risks, benefits, and alternatives were discussed. The patient does wish to proceed with the procedure. If you have questions, please do not hesitate to call me. I look forward to following Carrie along with you.     Sincerely,      Claudette Stalling, MD

## 2021-10-20 RX ORDER — SULFAMETHOXAZOLE AND TRIMETHOPRIM 800; 160 MG/1; MG/1
1 TABLET ORAL 2 TIMES DAILY
Qty: 10 TABLET | Refills: 0 | Status: SHIPPED | OUTPATIENT
Start: 2021-10-20 | End: 2021-10-21 | Stop reason: SDUPTHER

## 2021-10-20 NOTE — PROGRESS NOTES
HPI: per patient questionnaire  Exam: not applicable (electronic visit)  Diagnoses and all orders for this visit:    UTI symptoms  -     sulfamethoxazole-trimethoprim (BACTRIM DS) 800-160 MG per tablet; Take 1 tablet by mouth 2 times daily for 5 days        Patient instructed to call the office if worsens, or fails to improve as anticipated. 5-10 minutes were spent on the digital evaluation and management of this patient.

## 2021-10-21 DIAGNOSIS — R39.9 UTI SYMPTOMS: ICD-10-CM

## 2021-10-21 RX ORDER — SULFAMETHOXAZOLE AND TRIMETHOPRIM 800; 160 MG/1; MG/1
1 TABLET ORAL 2 TIMES DAILY
Qty: 10 TABLET | Refills: 0 | Status: SHIPPED | OUTPATIENT
Start: 2021-10-21 | End: 2021-10-26

## 2021-10-21 NOTE — TELEPHONE ENCOUNTER
Medication:   Requested Prescriptions     Pending Prescriptions Disp Refills    sulfamethoxazole-trimethoprim (BACTRIM DS) 800-160 MG per tablet 10 tablet 0     Sig: Take 1 tablet by mouth 2 times daily for 5 days        Last Filled:  Pharmacy did not receive, please resend     Patient Phone Number: [phone] (home) [phone] (work)    Last appt: 4/11/2019   Next appt: Visit date not found    Last OARRS: No flowsheet data found.

## 2021-11-04 ENCOUNTER — OFFICE VISIT (OUTPATIENT)
Dept: FAMILY MEDICINE CLINIC | Age: 36
End: 2021-11-04
Payer: COMMERCIAL

## 2021-11-04 VITALS
BODY MASS INDEX: 23.39 KG/M2 | DIASTOLIC BLOOD PRESSURE: 78 MMHG | OXYGEN SATURATION: 98 % | WEIGHT: 132 LBS | HEART RATE: 57 BPM | SYSTOLIC BLOOD PRESSURE: 122 MMHG | HEIGHT: 63 IN

## 2021-11-04 DIAGNOSIS — Z01.818 PREOP EXAMINATION: Primary | ICD-10-CM

## 2021-11-04 DIAGNOSIS — E04.1 THYROID NODULE: ICD-10-CM

## 2021-11-04 DIAGNOSIS — Z01.818 PREOP EXAMINATION: ICD-10-CM

## 2021-11-04 LAB
ANION GAP SERPL CALCULATED.3IONS-SCNC: 12 MMOL/L (ref 3–16)
BASOPHILS ABSOLUTE: 0.1 K/UL (ref 0–0.2)
BASOPHILS RELATIVE PERCENT: 2.2 %
BUN BLDV-MCNC: 10 MG/DL (ref 7–20)
CALCIUM SERPL-MCNC: 9.4 MG/DL (ref 8.3–10.6)
CHLORIDE BLD-SCNC: 103 MMOL/L (ref 99–110)
CO2: 28 MMOL/L (ref 21–32)
CREAT SERPL-MCNC: 0.8 MG/DL (ref 0.6–1.1)
EOSINOPHILS ABSOLUTE: 0.3 K/UL (ref 0–0.6)
EOSINOPHILS RELATIVE PERCENT: 5.1 %
GFR AFRICAN AMERICAN: >60
GFR NON-AFRICAN AMERICAN: >60
GLUCOSE BLD-MCNC: 80 MG/DL (ref 70–99)
HCT VFR BLD CALC: 39.9 % (ref 36–48)
HEMOGLOBIN: 13.5 G/DL (ref 12–16)
LYMPHOCYTES ABSOLUTE: 1.7 K/UL (ref 1–5.1)
LYMPHOCYTES RELATIVE PERCENT: 31.2 %
MCH RBC QN AUTO: 31.6 PG (ref 26–34)
MCHC RBC AUTO-ENTMCNC: 33.7 G/DL (ref 31–36)
MCV RBC AUTO: 93.7 FL (ref 80–100)
MONOCYTES ABSOLUTE: 0.3 K/UL (ref 0–1.3)
MONOCYTES RELATIVE PERCENT: 5.6 %
NEUTROPHILS ABSOLUTE: 3.1 K/UL (ref 1.7–7.7)
NEUTROPHILS RELATIVE PERCENT: 55.9 %
PDW BLD-RTO: 13.2 % (ref 12.4–15.4)
PLATELET # BLD: 265 K/UL (ref 135–450)
PMV BLD AUTO: 8.3 FL (ref 5–10.5)
POTASSIUM SERPL-SCNC: 4.4 MMOL/L (ref 3.5–5.1)
RBC # BLD: 4.26 M/UL (ref 4–5.2)
SODIUM BLD-SCNC: 143 MMOL/L (ref 136–145)
TSH REFLEX: 1.12 UIU/ML (ref 0.27–4.2)
WBC # BLD: 5.6 K/UL (ref 4–11)

## 2021-11-04 PROCEDURE — G8482 FLU IMMUNIZE ORDER/ADMIN: HCPCS | Performed by: FAMILY MEDICINE

## 2021-11-04 PROCEDURE — 99243 OFF/OP CNSLTJ NEW/EST LOW 30: CPT | Performed by: FAMILY MEDICINE

## 2021-11-04 PROCEDURE — 90674 CCIIV4 VAC NO PRSV 0.5 ML IM: CPT | Performed by: FAMILY MEDICINE

## 2021-11-04 PROCEDURE — 90471 IMMUNIZATION ADMIN: CPT | Performed by: FAMILY MEDICINE

## 2021-11-04 PROCEDURE — G8420 CALC BMI NORM PARAMETERS: HCPCS | Performed by: FAMILY MEDICINE

## 2021-11-04 PROCEDURE — G8427 DOCREV CUR MEDS BY ELIG CLIN: HCPCS | Performed by: FAMILY MEDICINE

## 2021-11-04 SDOH — ECONOMIC STABILITY: FOOD INSECURITY: WITHIN THE PAST 12 MONTHS, YOU WORRIED THAT YOUR FOOD WOULD RUN OUT BEFORE YOU GOT MONEY TO BUY MORE.: NEVER TRUE

## 2021-11-04 SDOH — ECONOMIC STABILITY: FOOD INSECURITY: WITHIN THE PAST 12 MONTHS, THE FOOD YOU BOUGHT JUST DIDN'T LAST AND YOU DIDN'T HAVE MONEY TO GET MORE.: NEVER TRUE

## 2021-11-04 ASSESSMENT — PATIENT HEALTH QUESTIONNAIRE - PHQ9
2. FEELING DOWN, DEPRESSED OR HOPELESS: 0
1. LITTLE INTEREST OR PLEASURE IN DOING THINGS: 0
SUM OF ALL RESPONSES TO PHQ QUESTIONS 1-9: 0
SUM OF ALL RESPONSES TO PHQ9 QUESTIONS 1 & 2: 0
SUM OF ALL RESPONSES TO PHQ QUESTIONS 1-9: 0
SUM OF ALL RESPONSES TO PHQ QUESTIONS 1-9: 0

## 2021-11-04 ASSESSMENT — SOCIAL DETERMINANTS OF HEALTH (SDOH): HOW HARD IS IT FOR YOU TO PAY FOR THE VERY BASICS LIKE FOOD, HOUSING, MEDICAL CARE, AND HEATING?: NOT HARD AT ALL

## 2021-11-04 NOTE — PROGRESS NOTES
[address] [facility] Medicine Preoperative Evaluation       Senait Adame MD                                       [address] [facility], [address] [place] [address] Phone     [phone] Fax    Dear Dr. Jojo Keen,     Thank you for referring Leonor Denney to me for Preoperative Evaluation. Below are the relevant portions of my assessment and plan of care. Leonor Denney    39 y.o.   1985    [address] [address]    Vitals:    11/04/21 1330   BP: 122/78   Site: Right Upper Arm   Position: Sitting   Cuff Size: Medium Adult   Pulse: 57   SpO2: 98%   Weight: 132 lb (59.9 kg)   Height: 5' 3\" (1.6 m)      Wt Readings from Last 2 Encounters:   11/04/21 132 lb (59.9 kg)   10/12/21 132 lb 9.6 oz (60.1 kg)     BP Readings from Last 3 Encounters:   11/04/21 122/78   10/12/21 132/79   04/11/19 110/64        No Known Allergies  No outpatient medications have been marked as taking for the 11/4/21 encounter (Office Visit) with Hailey King MD.       She presents to the office today for a preoperative consultation at the request of their surgeon, Dr. Jojo Keen who plans on performing left hemithyroidectomy on November 12. [facility] procedure [facility].  Planned anesthesia is General.  The patient has the following known anesthesia issues: none.     Past Medical History:   Diagnosis Date    Heart abnormalities age 8    benign heart murmur    Thyroid nodule 4/15/2019    Varicosities     vulvar variscosities     Past Surgical History:   Procedure Laterality Date    [place] SONO GUIDE NEEDLE BIOPSY  04/2019    benign    WISDOM TOOTH EXTRACTION  age 12     Social History     Socioeconomic History    Marital status:      Spouse name: Not on file    Number of children: 3    Years of education: Not on file    Highest education level: Not on file   Occupational History    Occupation: Nurse     Comment: [employer]   Tobacco Use    Smoking status: Never Smoker    Smokeless tobacco: Never Used   Substance and Sexual Activity    Alcohol use: No    Drug use: No    Sexual activity: Yes     Partners: Male     Birth control/protection: I.U.D. Other Topics Concern    Not on file   Social History Narrative    Not on file     Social Determinants of Health     Financial Resource Strain: Low Risk     Difficulty of Paying Living Expenses: Not hard at all   Food Insecurity: No Food Insecurity    Worried About Running Out of Food in the Last Year: Never true    [address] in the Last Year: Never true   Transportation Needs:     Lack of Transportation (Medical):      Lack of Transportation (Non-Medical):    Physical Activity:     Days of Exercise per Week:     Minutes of Exercise per Session:    Stress:     Feeling of Stress :    Social Connections:     Frequency of Communication with Friends and Family:     Frequency of Social Gatherings with Friends and Family:     Attends Latter day Services:     Active Member of Clubs or Organizations:     Attends Club or Organization Meetings:     Marital Status:    Intimate Partner Violence:     Fear of Current or Ex-Partner:     Emotionally Abused:     Physically Abused:     Sexually Abused:      Family History   Problem Relation Age of Onset    High Cholesterol Paternal Uncle     Cancer Maternal Grandmother 80        colon    High Blood Pressure Maternal Grandmother     High Cholesterol Maternal Grandmother     Diabetes Maternal Grandmother     Parkinsonism Maternal Grandmother     Other Mother         mental illness    Cancer Maternal Grandfather         BCC & SCC per PT         Review of Systems:  Constitutional: negative for fevers  Ears, nose, mouth, throat, and face: negative for nasal congestion and sore throat  Respiratory: negative for cough and shortness of breath  Cardiovascular: negative for chest pain and palpitations  Gastrointestinal: negative for abdominal pain and change in bowel habits       Physical Exam   /78 (Site: Right Upper Arm, Position: Sitting, Cuff Size: Medium Adult)   Pulse 57   Ht 5' 3\" (1.6 m)   Wt 132 lb (59.9 kg)   SpO2 98%   BMI 23.38 kg/m²   Constitutional: Patient is oriented to person, place, and time. She appears well-developed and well-nourished. No distress. Head: Normocephalic and atraumatic. Right Ear: Tympanic membrane, external ear and ear canal normal.   Left Ear: Tympanic membrane, external ear and ear canal normal.   Nose: Nose normal.   Mouth/Throat: Oropharynx is clear and moist, and mucous membranes are normal.  There is no cervical adenopathy. There are no loose teeth. Eyes: Conjunctivae and extraocular motions are normal. Pupils are equal, round, and reactive to light bilaterally. Neck: Neck supple. No JVD present. No mass and no thyromegaly present. Cardiovascular: Normal rate, regular rhythm, normal heart sounds and intact distal pulses. Exam reveals no gallop and no friction rub. No murmur heard. No carotid bruits. Pulmonary/Chest: Effort normal and breath sounds normal. No respiratory distress. There are no wheezes, rhonchi or rales. Abdominal: Soft, non-tender. Normal bowel sounds and aorta. There is no organomegaly, palpable mass. Neurological: She is alert and oriented to person, place, and time. No cranial nerve deficit. Coordination normal.   Skin: Skin is warm and dry. There is no rash or erythema. No suspicious lesions noted. Psychiatric: She has a normal mood and affect. Speech and behavior are normal. Judgment, cognition and memory are normal.      Lab Review   Orders Only on 09/10/2021   Component Date Value    Glucose 09/10/2021 85     Cholesterol, Total 09/10/2021 131     Triglycerides 09/10/2021 47     HDL 09/10/2021 54     LDL Calculated 09/10/2021 68           Assessment/Plan:    1. Preop examination  - Basic Metabolic Panel; Future  - CBC Auto Differential; Future    2.  Thyroid nodule  - TSH with Reflex; Future       39 y.o. patient with planned surgery as above. 1. Preoperative workup as follows: labs as ordered. 2. Change in medication regimen before surgery: N/A, not on any meds. 3. Prophylaxis for cardiac events with perioperative beta-blockers: Not indicated  ACC/AHA indications for pre-operative beta-blocker use:    · Vascular surgery with history of postitive stress test  · Intermediate or high risk surgery with history of CAD   · Intermediate or high risk surgery with multiple clinical predictors of CAD- 2 of the following: history of compensated or prior heart failure, history of cerebrovascular disease, DM, or renal insufficiency  Routine administration of higher-dose, long-acting metoprolol in beta-blocker-naïve patients on the day of surgery, and in the absence of dose titration is associated with an overall increase in mortality. Beta-blockers should be started days to weeks prior to surgery and titrated to pulse < 70.  4. Deep vein thrombosis prophylaxis: regimen to be chosen by surgical team  5. Pre-Operative Risk assessment using 2014 ACC/AHA guidelines     Emergent procedure No  Active Cardiac Condition No (decompensated HF, Arrhythmia, MI <3 weeks, severe valve disease)  Risk Level of Procedure Intermediate Risk (intraperitoneal, intrathoracic, HENT, orthopedic, or carotid endarterectomy, etc.)  Revised Cardiac Risk Index Risk factors: None  Measurement of Exercise Tolerance before Surgery >4 METs Yes functional capacity is classified as excellent (>10 METs), good (7 METs to 10 METs), moderate (4 METs to 6 METs), poor (<4 METs). Examples of activities associated with >4 METs are climbing a flight of stairs or walking up a hill, walking on level ground at 4 mph, and performing heavy work around the house.     According to the 2014 ACC/AHA pre-operative risk assessment guidelines this patient is a low risk for major cardiac complications during a intermediate risk procedure and may continue as planned provided labs are acceptable. If you have questions, please do not hesitate to call me. Sincerely,        Senait Graham MD

## 2021-11-10 NOTE — PROGRESS NOTES
0[address] [facility] patients having surgery or anesthesia are required to be Covid tested OR to have been vaccinated at least 14 days prior to your procedure. It is very important to return our call to [phone] and notify the staff of your last vaccination date otherwise you will be required to complete Covid PCR test within the 5-6 days prior to surgery & quarantine. The results will need to be faxed to PreAdmission Testing at [phone]. PRIOR TO PROCEDURE DATE:        1. PLEASE FOLLOW ANY  GUIDELINES/ INSTRUCTIONS PRIOR TO YOUR PROCEDURE AS ADVISED BY YOUR SURGEON. 2. Arrange for someone to drive you home and be with you for the first 24 hours after discharge for your safety after your procedure for which you received sedation. Ensure it is someone we can share information with regarding your discharge. 3. You must contact your surgeon for instructions IF:   You are taking any blood thinners, aspirin, anti-inflammatory or vitamin E.   There is a change in your physical condition such as a cold, fever, rash, cuts, sores or any other infection, especially near your surgical site. 4. Do not drink alcohol the day before or day of your procedure. 5. A Pre-op History and Physical for surgery MUST be completed by your Physician or Urgent Care within 30 days of your procedure date. Please bring a copy with you on the day of your procedure and along with any other testing performed. THE DAY OF YOUR PROCEDURE:  1. Follow instructions for ARRIVAL TIME as DIRECTED BY YOUR SURGEON. 2. Enter the MAIN entrance from [address] [place] [address] and follow the signs to the free [employer] parking (offered free of charge 6am-5pm). 3. Enter the Main Entrance of the hospital (do not enter from the lower level of the parking garage). Upon entrance, check in with the  at the main desk on your left. If no one is available at the desk, proceed into the [facility] Waiting Room and go through the door directly into the [facility]. There is a Check-in desk ACROSS from Room 5 (marked with a sign hanging from the ceiling). The phone number for the surgery center is [phone]. 4. Please call [phone] option #2 option #2 if you have not been preregistered yet. On the day of your procedure bring your insurance card and photo ID. You will be registered at your bedside once brought back to your room. 5. DO NOT EAT ANYTHING eight hours prior to your arrival for surgery. May have 8 ounces of water 4 hours prior to your arrival for surgery. NOTE: ALL Gastric, Bariatric and Bowel surgery patients MUST follow their surgeon's instructions. 6. MEDICATIONS    Take the following medications with a SMALL sip of water: None   Bariatric patient's call surgeon if on diabetic medications as some need to be stopped 1 week preop   Use your usual dose of inhalers the morning of surgery. BRING your rescue inhaler with you to hospital.    Anesthesia does NOT want you to take insulin the morning of surgery. They will control your blood sugar while you are at the hospital. Please contact your ordering physician for instructions regarding your insulin the night before your procedure. If you have an insulin pump, please keep it set on basal rate. 7. Do not swallow water when brushing teeth. No gum, candy, mints or ice chips. Refrain from smoking or at least decrease the amount. 8. Dress in loose, comfortable clothing appropriate for redressing after your procedure. Do not wear jewelry (including body piercings), make-up (especially NO eye make-up), fingernail polish (NO toenail polish if foot/leg surgery), lotion, powders or metal hairclips. 9. Dentures, glasses, or contacts will need to be removed before your procedure.  Bring cases for your glasses, contacts, dentures, or hearing aids to protect them while you are in surgery. 10. If you use a CPAP, please bring it with you on the day of your procedure. 11. We recommend that valuable personal  belongings such as cash, cell phones, e-tablets or jewelry, be left at home during your stay. The hospital will not be responsible for valuables that are not secured in the hospital safe. However, if your insurance requires a co-pay, you may want to bring a method of payment, i.e. Check or credit card, if you wish to pay your co-pay the day of surgery. 12. If you are to stay overnight, you may bring a bag with personal items. Please have any large items you may need brought in by your family after your arrival to your hospital room. 15. If you have a Living Will or Durable Power of , please bring a copy on the day of your procedure. 15. With your permission, one family member may accompany you while you are being prepared for surgery. Once you are ready, additional family members may join you. HOW WE KEEP YOU SAFE and WORK TO PREVENT SURGICAL SITE INFECTIONS:  1. Health care workers should always check your ID bracelet to verify your name and birth date. You will be asked many times to state your name, date of birth, and allergies. 2. Health care workers should always clean their hands with soap or alcohol gel before providing care to you. It is okay to ask anyone if they cleaned their hands before they touch you. 3. You will be actively involved in verifying the type of procedure you are having and ensuring the correct surgical site. This will be confirmed multiple times prior to your procedure. Do NOT amelia your surgery site UNLESS instructed to by your surgeon. 4. Do not shave or wax for 72 hours prior to procedure near your operative site. Shaving with a razor can irritate your skin and make it easier to develop an infection.  On the day of your procedure, any hair that needs to be removed near the surgical site will be clipped Yes Antibacterial Soap

## 2021-11-11 ENCOUNTER — ANESTHESIA EVENT (OUTPATIENT)
Dept: OPERATING ROOM | Age: 36
End: 2021-11-11
Payer: COMMERCIAL

## 2021-11-12 ENCOUNTER — HOSPITAL ENCOUNTER (OUTPATIENT)
Age: 36
Setting detail: OUTPATIENT SURGERY
Discharge: HOME | End: 2021-11-12
Attending: OTOLARYNGOLOGY | Admitting: OTOLARYNGOLOGY
Payer: COMMERCIAL

## 2021-11-12 ENCOUNTER — ANESTHESIA (OUTPATIENT)
Dept: OPERATING ROOM | Age: 36
End: 2021-11-12
Payer: COMMERCIAL

## 2021-11-12 VITALS
RESPIRATION RATE: 14 BRPM | WEIGHT: 130 LBS | BODY MASS INDEX: 22.2 KG/M2 | SYSTOLIC BLOOD PRESSURE: 124 MMHG | HEART RATE: 69 BPM | HEIGHT: 64 IN | DIASTOLIC BLOOD PRESSURE: 95 MMHG | TEMPERATURE: 98.9 F | OXYGEN SATURATION: 100 %

## 2021-11-12 VITALS — SYSTOLIC BLOOD PRESSURE: 131 MMHG | DIASTOLIC BLOOD PRESSURE: 79 MMHG | TEMPERATURE: 97 F | OXYGEN SATURATION: 100 %

## 2021-11-12 DIAGNOSIS — E04.1 LEFT THYROID NODULE: ICD-10-CM

## 2021-11-12 LAB — PREGNANCY, URINE: NEGATIVE

## 2021-11-12 PROCEDURE — 7100000011 HC PHASE II RECOVERY - ADDTL 15 MIN: Performed by: OTOLARYNGOLOGY

## 2021-11-12 PROCEDURE — 3700000000 HC ANESTHESIA ATTENDED CARE: Performed by: OTOLARYNGOLOGY

## 2021-11-12 PROCEDURE — 84703 CHORIONIC GONADOTROPIN ASSAY: CPT

## 2021-11-12 PROCEDURE — 60220 PARTIAL REMOVAL OF THYROID: CPT | Performed by: OTOLARYNGOLOGY

## 2021-11-12 PROCEDURE — 2720000010 HC SURG SUPPLY STERILE: Performed by: OTOLARYNGOLOGY

## 2021-11-12 PROCEDURE — 6360000002 HC RX W HCPCS: Performed by: NURSE ANESTHETIST, CERTIFIED REGISTERED

## 2021-11-12 PROCEDURE — 7100000010 HC PHASE II RECOVERY - FIRST 15 MIN: Performed by: OTOLARYNGOLOGY

## 2021-11-12 PROCEDURE — 7100000001 HC PACU RECOVERY - ADDTL 15 MIN: Performed by: OTOLARYNGOLOGY

## 2021-11-12 PROCEDURE — 2500000003 HC RX 250 WO HCPCS: Performed by: NURSE ANESTHETIST, CERTIFIED REGISTERED

## 2021-11-12 PROCEDURE — 3600000004 HC SURGERY LEVEL 4 BASE: Performed by: OTOLARYNGOLOGY

## 2021-11-12 PROCEDURE — 3700000001 HC ADD 15 MINUTES (ANESTHESIA): Performed by: OTOLARYNGOLOGY

## 2021-11-12 PROCEDURE — 7100000000 HC PACU RECOVERY - FIRST 15 MIN: Performed by: OTOLARYNGOLOGY

## 2021-11-12 PROCEDURE — 88307 TISSUE EXAM BY PATHOLOGIST: CPT

## 2021-11-12 PROCEDURE — 3600000014 HC SURGERY LEVEL 4 ADDTL 15MIN: Performed by: OTOLARYNGOLOGY

## 2021-11-12 PROCEDURE — 2709999900 HC NON-CHARGEABLE SUPPLY: Performed by: OTOLARYNGOLOGY

## 2021-11-12 PROCEDURE — 2500000003 HC RX 250 WO HCPCS: Performed by: OTOLARYNGOLOGY

## 2021-11-12 PROCEDURE — 2580000003 HC RX 258: Performed by: OTOLARYNGOLOGY

## 2021-11-12 PROCEDURE — 2580000003 HC RX 258: Performed by: ANESTHESIOLOGY

## 2021-11-12 RX ORDER — SODIUM CHLORIDE 0.9 % (FLUSH) 0.9 %
10 SYRINGE (ML) INJECTION PRN
Status: DISCONTINUED | OUTPATIENT
Start: 2021-11-12 | End: 2021-11-12 | Stop reason: HOSPADM

## 2021-11-12 RX ORDER — MIDAZOLAM HYDROCHLORIDE 1 MG/ML
INJECTION INTRAMUSCULAR; INTRAVENOUS PRN
Status: DISCONTINUED | OUTPATIENT
Start: 2021-11-12 | End: 2021-11-12 | Stop reason: SDUPTHER

## 2021-11-12 RX ORDER — SODIUM CHLORIDE, SODIUM LACTATE, POTASSIUM CHLORIDE, CALCIUM CHLORIDE 600; 310; 30; 20 MG/100ML; MG/100ML; MG/100ML; MG/100ML
INJECTION, SOLUTION INTRAVENOUS CONTINUOUS
Status: DISCONTINUED | OUTPATIENT
Start: 2021-11-12 | End: 2021-11-12 | Stop reason: HOSPADM

## 2021-11-12 RX ORDER — CEFAZOLIN SODIUM 1 G/3ML
INJECTION, POWDER, FOR SOLUTION INTRAMUSCULAR; INTRAVENOUS PRN
Status: DISCONTINUED | OUTPATIENT
Start: 2021-11-12 | End: 2021-11-12 | Stop reason: SDUPTHER

## 2021-11-12 RX ORDER — ROCURONIUM BROMIDE 10 MG/ML
INJECTION, SOLUTION INTRAVENOUS PRN
Status: DISCONTINUED | OUTPATIENT
Start: 2021-11-12 | End: 2021-11-12 | Stop reason: SDUPTHER

## 2021-11-12 RX ORDER — HYDRALAZINE HYDROCHLORIDE 20 MG/ML
5 INJECTION INTRAMUSCULAR; INTRAVENOUS EVERY 5 MIN PRN
Status: DISCONTINUED | OUTPATIENT
Start: 2021-11-12 | End: 2021-11-12 | Stop reason: HOSPADM

## 2021-11-12 RX ORDER — SODIUM CHLORIDE 9 MG/ML
25 INJECTION, SOLUTION INTRAVENOUS PRN
Status: DISCONTINUED | OUTPATIENT
Start: 2021-11-12 | End: 2021-11-12 | Stop reason: HOSPADM

## 2021-11-12 RX ORDER — ONDANSETRON 2 MG/ML
4 INJECTION INTRAMUSCULAR; INTRAVENOUS
Status: DISCONTINUED | OUTPATIENT
Start: 2021-11-12 | End: 2021-11-12 | Stop reason: HOSPADM

## 2021-11-12 RX ORDER — OXYCODONE HYDROCHLORIDE 5 MG/1
5 TABLET ORAL EVERY 6 HOURS PRN
Qty: 10 TABLET | Refills: 0 | Status: SHIPPED | OUTPATIENT
Start: 2021-11-12 | End: 2021-11-22

## 2021-11-12 RX ORDER — FENTANYL CITRATE 50 UG/ML
INJECTION, SOLUTION INTRAMUSCULAR; INTRAVENOUS PRN
Status: DISCONTINUED | OUTPATIENT
Start: 2021-11-12 | End: 2021-11-12 | Stop reason: SDUPTHER

## 2021-11-12 RX ORDER — LIDOCAINE HYDROCHLORIDE 20 MG/ML
INJECTION, SOLUTION INTRAVENOUS PRN
Status: DISCONTINUED | OUTPATIENT
Start: 2021-11-12 | End: 2021-11-12 | Stop reason: SDUPTHER

## 2021-11-12 RX ORDER — DEXAMETHASONE SODIUM PHOSPHATE 4 MG/ML
INJECTION, SOLUTION INTRA-ARTICULAR; INTRALESIONAL; INTRAMUSCULAR; INTRAVENOUS; SOFT TISSUE PRN
Status: DISCONTINUED | OUTPATIENT
Start: 2021-11-12 | End: 2021-11-12 | Stop reason: SDUPTHER

## 2021-11-12 RX ORDER — SODIUM CHLORIDE 0.9 % (FLUSH) 0.9 %
10 SYRINGE (ML) INJECTION EVERY 12 HOURS SCHEDULED
Status: DISCONTINUED | OUTPATIENT
Start: 2021-11-12 | End: 2021-11-12 | Stop reason: HOSPADM

## 2021-11-12 RX ORDER — FENTANYL CITRATE 50 UG/ML
25 INJECTION, SOLUTION INTRAMUSCULAR; INTRAVENOUS EVERY 5 MIN PRN
Status: DISCONTINUED | OUTPATIENT
Start: 2021-11-12 | End: 2021-11-12 | Stop reason: HOSPADM

## 2021-11-12 RX ORDER — ENALAPRILAT 2.5 MG/2ML
1.25 INJECTION INTRAVENOUS
Status: DISCONTINUED | OUTPATIENT
Start: 2021-11-12 | End: 2021-11-12 | Stop reason: HOSPADM

## 2021-11-12 RX ORDER — LABETALOL HYDROCHLORIDE 5 MG/ML
5 INJECTION, SOLUTION INTRAVENOUS EVERY 10 MIN PRN
Status: DISCONTINUED | OUTPATIENT
Start: 2021-11-12 | End: 2021-11-12 | Stop reason: HOSPADM

## 2021-11-12 RX ORDER — LIDOCAINE HYDROCHLORIDE 10 MG/ML
1 INJECTION, SOLUTION EPIDURAL; INFILTRATION; INTRACAUDAL; PERINEURAL
Status: DISCONTINUED | OUTPATIENT
Start: 2021-11-12 | End: 2021-11-12 | Stop reason: HOSPADM

## 2021-11-12 RX ORDER — MAGNESIUM HYDROXIDE 1200 MG/15ML
LIQUID ORAL CONTINUOUS PRN
Status: COMPLETED | OUTPATIENT
Start: 2021-11-12 | End: 2021-11-12

## 2021-11-12 RX ORDER — SUCCINYLCHOLINE/SOD CL,ISO/PF 200MG/10ML
SYRINGE (ML) INTRAVENOUS PRN
Status: DISCONTINUED | OUTPATIENT
Start: 2021-11-12 | End: 2021-11-12 | Stop reason: SDUPTHER

## 2021-11-12 RX ORDER — LIDOCAINE HYDROCHLORIDE AND EPINEPHRINE 10; 10 MG/ML; UG/ML
INJECTION, SOLUTION INFILTRATION; PERINEURAL PRN
Status: DISCONTINUED | OUTPATIENT
Start: 2021-11-12 | End: 2021-11-12 | Stop reason: ALTCHOICE

## 2021-11-12 RX ORDER — FENTANYL CITRATE 50 UG/ML
50 INJECTION, SOLUTION INTRAMUSCULAR; INTRAVENOUS EVERY 5 MIN PRN
Status: DISCONTINUED | OUTPATIENT
Start: 2021-11-12 | End: 2021-11-12 | Stop reason: HOSPADM

## 2021-11-12 RX ORDER — PROPOFOL 10 MG/ML
INJECTION, EMULSION INTRAVENOUS PRN
Status: DISCONTINUED | OUTPATIENT
Start: 2021-11-12 | End: 2021-11-12 | Stop reason: SDUPTHER

## 2021-11-12 RX ADMIN — FENTANYL CITRATE 50 MCG: 50 INJECTION, SOLUTION INTRAMUSCULAR; INTRAVENOUS at 09:54

## 2021-11-12 RX ADMIN — PROPOFOL 50 MG: 10 INJECTION, EMULSION INTRAVENOUS at 10:06

## 2021-11-12 RX ADMIN — SODIUM CHLORIDE, POTASSIUM CHLORIDE, SODIUM LACTATE AND CALCIUM CHLORIDE: 600; 310; 30; 20 INJECTION, SOLUTION INTRAVENOUS at 08:16

## 2021-11-12 RX ADMIN — ROCURONIUM BROMIDE 5 MG: 10 INJECTION INTRAVENOUS at 09:44

## 2021-11-12 RX ADMIN — FENTANYL CITRATE 50 MCG: 50 INJECTION, SOLUTION INTRAMUSCULAR; INTRAVENOUS at 09:44

## 2021-11-12 RX ADMIN — DEXAMETHASONE SODIUM PHOSPHATE 4 MG: 4 INJECTION, SOLUTION INTRAMUSCULAR; INTRAVENOUS at 09:50

## 2021-11-12 RX ADMIN — SODIUM CHLORIDE, POTASSIUM CHLORIDE, SODIUM LACTATE AND CALCIUM CHLORIDE: 600; 310; 30; 20 INJECTION, SOLUTION INTRAVENOUS at 10:28

## 2021-11-12 RX ADMIN — Medication 100 MG: at 09:44

## 2021-11-12 RX ADMIN — MIDAZOLAM HYDROCHLORIDE 2 MG: 2 INJECTION, SOLUTION INTRAMUSCULAR; INTRAVENOUS at 09:36

## 2021-11-12 RX ADMIN — LIDOCAINE HYDROCHLORIDE 50 MG: 20 INJECTION, SOLUTION INTRAVENOUS at 09:44

## 2021-11-12 RX ADMIN — PROPOFOL 150 MG: 10 INJECTION, EMULSION INTRAVENOUS at 09:44

## 2021-11-12 RX ADMIN — CEFAZOLIN SODIUM 2000 MG: 1 POWDER, FOR SOLUTION INTRAMUSCULAR; INTRAVENOUS at 09:54

## 2021-11-12 ASSESSMENT — PULMONARY FUNCTION TESTS
PIF_VALUE: 16
PIF_VALUE: 3
PIF_VALUE: 17
PIF_VALUE: 17
PIF_VALUE: 21
PIF_VALUE: 17
PIF_VALUE: 16
PIF_VALUE: 16
PIF_VALUE: 17
PIF_VALUE: 20
PIF_VALUE: 17
PIF_VALUE: 18
PIF_VALUE: 17
PIF_VALUE: 16
PIF_VALUE: 17
PIF_VALUE: 3
PIF_VALUE: 17
PIF_VALUE: 18
PIF_VALUE: 17
PIF_VALUE: 1
PIF_VALUE: 17
PIF_VALUE: 16
PIF_VALUE: 17
PIF_VALUE: 15
PIF_VALUE: 17
PIF_VALUE: 27
PIF_VALUE: 0
PIF_VALUE: 1
PIF_VALUE: 17
PIF_VALUE: 2
PIF_VALUE: 15
PIF_VALUE: 16
PIF_VALUE: 16
PIF_VALUE: 1
PIF_VALUE: 17
PIF_VALUE: 17
PIF_VALUE: 18
PIF_VALUE: 17
PIF_VALUE: 17
PIF_VALUE: 15
PIF_VALUE: 17
PIF_VALUE: 15
PIF_VALUE: 17
PIF_VALUE: 0
PIF_VALUE: 17
PIF_VALUE: 16
PIF_VALUE: 15
PIF_VALUE: 17
PIF_VALUE: 1
PIF_VALUE: 17
PIF_VALUE: 16
PIF_VALUE: 17
PIF_VALUE: 17
PIF_VALUE: 16
PIF_VALUE: 16
PIF_VALUE: 3
PIF_VALUE: 17
PIF_VALUE: 0
PIF_VALUE: 16
PIF_VALUE: 17
PIF_VALUE: 16
PIF_VALUE: 17
PIF_VALUE: 17
PIF_VALUE: 14
PIF_VALUE: 17
PIF_VALUE: 0
PIF_VALUE: 17
PIF_VALUE: 17
PIF_VALUE: 4
PIF_VALUE: 16
PIF_VALUE: 17
PIF_VALUE: 3
PIF_VALUE: 2
PIF_VALUE: 0
PIF_VALUE: 17

## 2021-11-12 ASSESSMENT — PAIN - FUNCTIONAL ASSESSMENT: PAIN_FUNCTIONAL_ASSESSMENT: 0-10

## 2021-11-12 ASSESSMENT — PAIN DESCRIPTION - ONSET: ONSET: GRADUAL

## 2021-11-12 ASSESSMENT — PAIN DESCRIPTION - FREQUENCY: FREQUENCY: CONTINUOUS

## 2021-11-12 ASSESSMENT — PAIN SCALES - GENERAL
PAINLEVEL_OUTOF10: 3
PAINLEVEL_OUTOF10: 0
PAINLEVEL_OUTOF10: 0

## 2021-11-12 ASSESSMENT — PAIN DESCRIPTION - DESCRIPTORS: DESCRIPTORS: SORE

## 2021-11-12 ASSESSMENT — PAIN DESCRIPTION - PROGRESSION: CLINICAL_PROGRESSION: GRADUALLY WORSENING

## 2021-11-12 ASSESSMENT — PAIN DESCRIPTION - PAIN TYPE: TYPE: ACUTE PAIN;SURGICAL PAIN

## 2021-11-12 ASSESSMENT — PAIN DESCRIPTION - LOCATION: LOCATION: NECK

## 2021-11-12 ASSESSMENT — PAIN DESCRIPTION - ORIENTATION: ORIENTATION: LOWER;ANTERIOR

## 2021-11-12 NOTE — PROGRESS NOTES
Ambulatory Surgery/Procedure Discharge Note    Vitals:    11/12/21 1208   BP: (!) 124/95   Pulse: 69   Resp: 14   Temp: 98.9 °F (37.2 °C)   SpO2: 100%       In: 1690 [P.O.:290; I.V.:1400]  Out: 50  drank some ice water, declined snack, and urinated X 1 large amount in toilet    Restroom use offered before discharge. Yes -- large amount    Pain assessment:  present - adequately treated, lower anterior neck  Pain Level: 3    Postop BP of 124/95 within 20% of preop BP. Pt returned to [facility] from PACU s/p left hemithyroidectomy. Pt fully alert; speech clear; breathing easily on RA; VSS; has surgical wound/site at base of anterior neck closed with surgical glue, and edges well approximated and clean, dry and intact. Ice pack to site;  States pain is 3/10, and denies any nausea. Drank ice water and declined snack; voided in toilet without problem. IV removed, and dressing applied. Discharge instructions discussed with pt and pt's  at bedside, and both verbalized understanding. RX for pain med handed to  to be filled at outside pharmacy.  assisted pt to dress. Patient discharged to home/self care.  Patient discharged via wheel chair by RN to waiting family/S.O.       11/12/2021 1:17 PM

## 2021-11-12 NOTE — PROGRESS NOTES
Patient received from OR to PACU#4 post LEFT HEMITHYROIDECTOMY of Dr. Sami Velez. Patient is placed on cardiac monitor. Report is given per CRNA. Per report, patient was stable during procedure. On arrival, patient is arousable and denies pain.

## 2021-11-12 NOTE — H&P
Week: Not on file    Minutes of Exercise per Session: Not on file   Stress:     Feeling of Stress : Not on file   Social Connections:     Frequency of Communication with Friends and Family: Not on file    Frequency of Social Gatherings with Friends and Family: Not on file    Attends [employer] Services: Not on file    Active Member of [address] [place] [address] or Organizations: Not on file    Attends Club or Organization Meetings: Not on file    Marital Status: Not on file   Intimate Partner Violence:     Fear of Current or Ex-Partner: Not on file    Emotionally Abused: Not on file    Physically Abused: Not on file    Sexually Abused: Not on file   Housing Stability:     Unable to Pay for Housing in the Last Year: Not on file    Number of [place] in the Last Year: Not on file    Unstable Housing in the Last Year: Not on file         Medications Prior to Admission:      None    Allergies:  Patient has no known allergies. PHYSICAL EXAM:      /76   Pulse 77   Temp 97 °F (36.1 °C) (Temporal)   Resp 12   Ht 5' 3.5\" (1.613 m)   Wt 130 lb (59 kg)   SpO2 100%   BMI 22.67 kg/m²      Airway:  Airway patent with no audible stridor    Heart:  Regular rate and rhythm, No murmur noted    Lungs:  No increased work of breathing, good air exchange, clear to auscultation bilaterally, no crackles or wheezing    Abdomen:  Soft, non-distended, non-tender, no rebound tenderness or guarding, and no masses palpated    ASSESSMENT AND PLAN     Patient is a 39 y.o. female with above specified procedure planned. 1.  The patients history and physical was obtained and signed off by the pre-admission testing department. Patient seen and focused exam done today- no new changes since last physical exam on 11/4/21    2. Access to ancillary services are available per request of the provider.     Esther Ware, APRN - CNP     11/12/2021

## 2021-11-12 NOTE — OP NOTE
Operative Note    Patient Name: Frank Day  YOB: 1985  Medical Record Number:  [id number]  Billing Number:  [id number]  Date of Procedure: 11/12/2021  Time: 5084      DATE OF SURGERY:  11/12/2021     ATTENDING SURGEON:  Meg López MD PHD  ASSISTANT: Circulator: Ceci High RN  Scrub Person First: Rachel Pablo  Resident: Rubi Snowden,   Scrub Tech Student: Humaira Mireles    PREOPERATIVE DIAGNOSES:  left Thyroid nodule [E04.1]     POSTOPERATIVE DIAGNOSES:  same as pre-op    PROCEDURE(S) PERFORMED:  Procedure(s):  left hemithyroidectomy (44589) (comp 14607)  Continuous Laryngeal Nerve Integrity Monitoring (77483)    ESTIMATED BLOOD LOSS:  less than 25 mL     SPECIMENS:    ID  Type  Source  Tests  Collected by  Time  Destination    A : Left Thyroid  Tissue  Thyroid  SURGICAL PATHOLOGY EXAM  Meg López MD THE [facility]  90/60/3979          COMPLICATIONS:  None. ANESTHESIA:  General            Indications: This is a 39 y.o. female with a history of left Thyroid nodule [E04.1]. After a discussion of risks, benefits, and alternate treatment options, the patient elected to proceed with left barbara- possible total thyroidectomy. Informed consent obtained. DETAILS OF PROCEDURE(S):        The patient was brought to the operating room,    placed in a supine position, and induced and intubated per anesthesia. The    patient was intubated with a  Xomed nerve integrity monitoring    endotracheal tube. Direct laryngoscopy confirmed accurate placement of the    EMG contact electrodes to the vocalis muscles of the endolarynx bilaterally. Subdermal ground electrodes were placed and all electrodes hooked up to the    nerve monitor, which was turned on and set for continuous laryngeal nerve    monitoring for the remainder of the  procedure. Only a short-acting    muscle relaxant was used for intubation, no further muscle relaxant given,    and no topical laryngeal anesthetic was used. After confirming the correct patient, procedure, and site using standard    timeout technique, the neck was prepped and draped in standard sterile    fashion. A horizontal skin incision was made through a previously identified    skin crease. Dissection was carried down to the subplatysmal plane. The    strap muscles were  in the midline and retracted laterally on the    left. The left  thyroid gland was mobilized medially. Attention was turned to    the superior pole, with identification and preservation of the superior    laryngeal nerve and cricothyroid muscle medially. The integrity of the nerve    was confirmed with the nerve stimulator and monitor. The mid and inferior thyroid was dissected in a capsulr plane. The inferior and    superior parathyroid glands were preserved along with their blood supply. The recurrent nerve was identified and integrity confirmed with the nerve stimulator and monitor. It was followed to its insertion near the cricothyroid    membrane, Garsia's ligament was sharply transected, and the thyroid lobe was sent for permanent histologic diagnosis. The recurrent laryngeal nerve did stimulate prior to closure. Hemostasis was obtained. The sponge and needle counts were correct. The wound was closed in layers. The patient was returned to anesthesia care, awakened, extubated, and taken to the recovery room in good condition. I attest that I was present for a performed the key points of the procedure myself.        Electronically signed by Liat Campbell MD on 11/12/2021 at 10:52 AM

## 2021-11-12 NOTE — BRIEF OP NOTE
Brief Postoperative Note      Patient: Seema Nunez  YOB: 1985  MRN: [id number]    Date of Procedure: 11/12/2021    Pre-Op Diagnosis: Left thyroid nodule [E04.1]    Post-Op Diagnosis: Same       Procedure(s):  LEFT HEMITHYROIDECTOMY    Surgeon(s):  Raudel Barrett MD    Assistant:  Resident: Jazmin Trinidad DO    Anesthesia: General    Estimated Blood Loss (mL): 25    Complications: None    Specimens:   ID Type Source Tests Collected by Time Destination   A : LEFT HEMITHYROID Specimen Neck SURGICAL PATHOLOGY Raudel Barrett MD 11/12/2021 1048        Implants:  * No implants in log *      Drains: * No LDAs found *    Findings: Large left thyroid nodule    Electronically signed by Brandon Cates MD on 11/12/2021 at 10:51 AM

## 2021-11-12 NOTE — ANESTHESIA PRE PROCEDURE
Department of Anesthesiology  Preprocedure Note       Name:  Nasir Ordaz   Age:  39 y.o.  :  1985                                          MRN:  [id number]         Date:  2021      Surgeon: Suad Mccormack):  Emilio Bosch MD    Procedure: Procedure(s):  LEFT HEMITHYROIDECTOMY    Medications prior to admission:   Prior to Admission medications    Not on File       Current medications:    Current Facility-Administered Medications   Medication Dose Route Frequency Provider Last Rate Last Admin    lactated ringers infusion   IntraVENous Continuous Lu Gell, DO        lactated ringers infusion   IntraVENous Continuous Aura Lisa MD        sodium chloride flush 0.9 % injection 10 mL  10 mL IntraVENous 2 times per day Aura Lisa MD        sodium chloride flush 0.9 % injection 10 mL  10 mL IntraVENous PRN Aura Lisa MD        0.9 % sodium chloride infusion  25 mL IntraVENous PRN Aura Lisa MD        lidocaine PF 1 % injection 1 mL  1 mL IntraDERmal Once PRN Aura Lisa MD           Allergies:  No Known Allergies    Problem List:    Patient Active Problem List   Diagnosis Code    Thyroid nodule E04.1       Past Medical History:        Diagnosis Date    Heart abnormalities age 8    benign heart murmur    Thyroid nodule 4/15/2019    Varicosities     vulvar variscosities       Past Surgical History:        Procedure Laterality Date    NJ SONO GUIDE NEEDLE BIOPSY  2019    benign    WISDOM TOOTH EXTRACTION  age 12       Social History:    Social History     Tobacco Use    Smoking status: Never Smoker    Smokeless tobacco: Never Used   Substance Use Topics    Alcohol use: Yes     Comment: occ                                Counseling given: Not Answered      Vital Signs (Current):   Vitals:    11/10/21 1135 21 0739   BP:  128/76   Pulse:  77   Resp:  12   Temp:  97 °F (36.1 °C)   TempSrc:  Temporal   SpO2:  100%   Weight: 130 lb (59 kg) 130 lb (59 kg) Height: 5' 3.5\" (1.613 m) 5' 3.5\" (1.613 m)                                              BP Readings from Last 3 Encounters:   11/12/21 128/76   11/04/21 122/78   10/12/21 132/79       NPO Status: Time of last liquid consumption: 2100                        Time of last solid consumption: 2100                        Date of last liquid consumption: 11/11/21                        Date of last solid food consumption: 11/11/21    BMI:   Wt Readings from Last 3 Encounters:   11/12/21 130 lb (59 kg)   11/04/21 132 lb (59.9 kg)   10/12/21 132 lb 9.6 oz (60.1 kg)     Body mass index is 22.67 kg/m². CBC:   Lab Results   Component Value Date    WBC 5.6 11/04/2021    RBC 4.26 11/04/2021    HGB 13.5 11/04/2021    HCT 39.9 11/04/2021    MCV 93.7 11/04/2021    RDW 13.2 11/04/2021     11/04/2021       CMP:   Lab Results   Component Value Date     11/04/2021    K 4.4 11/04/2021     11/04/2021    CO2 28 11/04/2021    BUN 10 11/04/2021    CREATININE 0.8 11/04/2021    GFRAA >60 11/04/2021    GFRAA >60 07/26/2010    AGRATIO 2.0 10/25/2017    LABGLOM >60 11/04/2021    GLUCOSE 80 11/04/2021    PROT 7.0 10/25/2017    PROT 5.9 07/26/2010    CALCIUM 9.4 11/04/2021    BILITOT 0.7 10/25/2017    ALKPHOS 79 10/25/2017    AST 15 10/25/2017    ALT 13 10/25/2017       POC Tests: No results for input(s): POCGLU, POCNA, POCK, POCCL, POCBUN, POCHEMO, POCHCT in the last 72 hours.     Coags: No results found for: PROTIME, INR, APTT    HCG (If Applicable):   Lab Results   Component Value Date    PREGTESTUR Negative 11/12/2021        ABGs: No results found for: PHART, PO2ART, RSA7WGJ, BAP8BDG, BEART, H8ASNWML     Type & Screen (If Applicable):  Lab Results   Component Value Date    LABABO B 07/31/2012    LABRH Positive 07/31/2012       Drug/Infectious Status (If Applicable):  Lab Results   Component Value Date    HEPCAB Non-Reactive (Negative) 07/31/2012       COVID-19 Screening (If Applicable): No results found for: COVID19        Anesthesia Evaluation  Patient summary reviewed and Nursing notes reviewed no history of anesthetic complications:   Airway: Mallampati: I  TM distance: >3 FB   Neck ROM: full  Mouth opening: > = 3 FB Dental: normal exam         Pulmonary:Negative Pulmonary ROS                              Cardiovascular:Negative CV ROS                      Neuro/Psych:   Negative Neuro/Psych ROS              GI/Hepatic/Renal: Neg GI/Hepatic/Renal ROS            Endo/Other:                      ROS comment: enlarged thyroid  Abdominal:             Vascular: negative vascular ROS. Other Findings:             Anesthesia Plan      general     ASA 2       Induction: intravenous. Anesthetic plan and risks discussed with patient.                       Macey Mae MD   11/12/2021

## 2021-11-12 NOTE — ANESTHESIA POSTPROCEDURE EVALUATION
Department of Anesthesiology  Postprocedure Note    Patient: Leopoldo Duke  MRN: [id number]  YOB: 1985  Date of evaluation: 11/12/2021  Time:  2:34 PM     Procedure Summary     Date: 11/12/21 Room / Location: [facility][note 3] [address] [place] [address] / [facility]    Anesthesia Start: 0940 Anesthesia Stop: 1127    Procedure: LEFT HEMITHYROIDECTOMY (Left ) Diagnosis:       Left thyroid nodule      (Left thyroid nodule [E04.1])    Surgeons: Tali Larry MD Responsible Provider: Zo Aviles MD    Anesthesia Type: general ASA Status: 2          Anesthesia Type: general    Paul Phase I: Paul Score: 10    Paul Phase II: Paul Score: 10    Last vitals: Reviewed and per EMR flowsheets.        Anesthesia Post Evaluation    Patient participation: complete - patient participated  Level of consciousness: awake  Airway patency: patent  Nausea & Vomiting: no nausea and no vomiting  Complications: no  Cardiovascular status: hemodynamically stable  Respiratory status: acceptable  Hydration status: stable

## 2021-11-18 ENCOUNTER — OFFICE VISIT (OUTPATIENT)
Dept: ENT CLINIC | Age: 36
End: 2021-11-18

## 2021-11-18 DIAGNOSIS — Z48.89 POSTOPERATIVE VISIT: Primary | ICD-10-CM

## 2021-11-18 PROCEDURE — 99024 POSTOP FOLLOW-UP VISIT: CPT | Performed by: OTOLARYNGOLOGY

## 2021-11-18 NOTE — PROGRESS NOTES
Status post barbara thyroidectomy. Doing well. No pain. Voice is good. Benign pathology. Pe: Incision clean, dry and intact. Mirror exam was performed. The nasopharynx, larynx,or hypopharynx were examined. Vocal fold motion was examined. Pertinent findings include: normal laryngeal motion    A/P: Follow up in 5 weeks for incision check and TSH levels.

## 2021-12-20 ENCOUNTER — OFFICE VISIT (OUTPATIENT)
Dept: ENT CLINIC | Age: 36
End: 2021-12-20

## 2021-12-20 DIAGNOSIS — Z48.89 POSTOPERATIVE VISIT: ICD-10-CM

## 2021-12-20 DIAGNOSIS — E04.1 THYROID NODULE: Primary | ICD-10-CM

## 2021-12-20 DIAGNOSIS — E04.1 THYROID NODULE: ICD-10-CM

## 2021-12-20 LAB — TSH REFLEX: 2.16 UIU/ML (ref 0.27–4.2)

## 2021-12-20 PROCEDURE — 99024 POSTOP FOLLOW-UP VISIT: CPT | Performed by: OTOLARYNGOLOGY

## 2021-12-20 NOTE — PROGRESS NOTES
Status post total/partial thyroidectomy. Doing well. No pain. Voice is good. Pe: Incision clean, dry and intact. Mirror exam was performed. The nasopharynx, larynx,or hypopharynx were examined. Vocal fold motion was examined. Pertinent findings include: normal laryngeal motion. A/P: Follow up in 5 weeks for incision check and TSH levels.

## 2021-12-21 ENCOUNTER — TELEPHONE (OUTPATIENT)
Dept: ENT CLINIC | Age: 36
End: 2021-12-21

## 2022-06-09 ENCOUNTER — OFFICE VISIT (OUTPATIENT)
Dept: DERMATOLOGY | Age: 37
End: 2022-06-09
Payer: COMMERCIAL

## 2022-06-09 DIAGNOSIS — D22.9 MULTIPLE NEVI: Primary | ICD-10-CM

## 2022-06-09 DIAGNOSIS — Z80.8 FAMILY HISTORY OF MELANOMA: ICD-10-CM

## 2022-06-09 DIAGNOSIS — L70.0 ACNE VULGARIS: ICD-10-CM

## 2022-06-09 DIAGNOSIS — D48.5 NEOPLASM OF UNCERTAIN BEHAVIOR OF SKIN: ICD-10-CM

## 2022-06-09 DIAGNOSIS — Z86.018 HISTORY OF DYSPLASTIC NEVUS: ICD-10-CM

## 2022-06-09 PROCEDURE — 99213 OFFICE O/P EST LOW 20 MIN: CPT | Performed by: DERMATOLOGY

## 2022-06-09 PROCEDURE — 11102 TANGNTL BX SKIN SINGLE LES: CPT | Performed by: DERMATOLOGY

## 2022-06-09 NOTE — PATIENT INSTRUCTIONS
Biopsy Wound Care Instructions    · Keep the bandage in place for 24 hours. · Cleanse the wound with mild soapy water daily   Gently dry the area.  Apply Vaseline or petroleum jelly to the wound using a cotton tipped applicator.  Cover with a clean bandage.  Repeat this process until the biopsy site is healed.  If you had stitches placed, continue treating the site until the stitches are removed. Remember to make an appointment to return to have your stitches removed by our staff.  You may shower and bathe as usual.       ** Biopsy results generally take around 7 business days to come back. If you have not heard from us by then, please call the office at [phone]. *Please note that biopsy results are released to both the patient and physician at the same time in [address] Please allow time for your physician to review the results. One of our staff members will reach out to you with the results and plan.

## 2022-06-13 LAB — DERMATOLOGY PATHOLOGY REPORT: NORMAL

## 2022-08-05 LAB
CHOLESTEROL, TOTAL: 128 MG/DL (ref 0–199)
GLUCOSE BLD-MCNC: 90 MG/DL (ref 70–99)
HDLC SERPL-MCNC: 61 MG/DL (ref 40–60)
LDL CHOLESTEROL CALCULATED: 59 MG/DL
TRIGL SERPL-MCNC: 40 MG/DL (ref 0–150)

## 2023-08-28 LAB
CHOLEST SERPL-MCNC: 113 MG/DL (ref 0–199)
GLUCOSE SERPL-MCNC: 88 MG/DL (ref 70–99)
HDLC SERPL-MCNC: 51 MG/DL (ref 40–60)
LDLC SERPL CALC-MCNC: 53 MG/DL
TRIGL SERPL-MCNC: 47 MG/DL (ref 0–150)

## 2024-02-22 ENCOUNTER — OFFICE VISIT (OUTPATIENT)
Dept: DERMATOLOGY | Age: 39
End: 2024-02-22
Payer: COMMERCIAL

## 2024-02-22 DIAGNOSIS — D22.9 MULTIPLE NEVI: Primary | ICD-10-CM

## 2024-02-22 DIAGNOSIS — L81.4 LENTIGINES: ICD-10-CM

## 2024-02-22 DIAGNOSIS — Z86.018 HISTORY OF DYSPLASTIC NEVUS: ICD-10-CM

## 2024-02-22 DIAGNOSIS — Z80.8 FAMILY HISTORY OF MELANOMA: ICD-10-CM

## 2024-02-22 PROCEDURE — 99213 OFFICE O/P EST LOW 20 MIN: CPT | Performed by: DERMATOLOGY

## 2024-02-22 NOTE — PROGRESS NOTES
[facility]  Lila Dukes MD  [phone]      Carrie French  1985    38 y.o. female     Date of Visit: 2/22/2024    Chief Complaint: lesion/moles  Chief Complaint   Patient presents with    Skin Exam     Last seen: 6-2022 for skin check    History of Present Illness:    She has an almost 7 yo, an 12 yo daughter and a 15 yo son and 12 yo daughter (ran XC this year).   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    1, 2. Here for evaluation multiple asx pigmented lesions on the trunk and extremities, present for many years; no change in size/shape/color of any lesions; no bleeding lesions.  Notes a raised papule on the back to be checked.    No personal history of skin cancer.  Grandfather with hx of BCC and melanoma.  She had a dysplastic nevus on the back removed as a teenager.    3. Frontal/crown scalp - at part in hair - benign nevus - bx 2022.  No probs since.    4. F/u for acne.  Using BP wash with some improvement.  Not as bad.  Breakouts along the lower face and jawline since 2019.  Started pre-Covid so not associated or triggered by mask wearing.  Has an IUD so does not have regular periods so she is not sure if there is any cyclical or hormonal correlation.      She burns easily.  No tanning beds.  She wears sunscreen regularly but doesn't always reapply if she is out.    Review of Systems:  Gen: Feels well, good sense of health.  Skin: No changing moles or lesions.  No new rashes.    Past Medical History, Family History, Surgical History, Medications and Allergies reviewed.      Past Medical History:   Diagnosis Date    Heart abnormalities age 6    benign heart murmur    Thyroid nodule 4/15/2019    Varicosities     vulvar variscosities       Past Surgical History:   Procedure Laterality Date    CHG US GUIDANCE NEEDLE PLACEMENT IMG S&I  04/2019    benign    THYROIDECTOMY Left 11/12/2021    LEFT HEMITHYROIDECTOMY performed by Yogesh Mendoza MD at [facility] OR    WISDOM TOOTH EXTRACTION

## 2024-02-22 NOTE — PATIENT INSTRUCTIONS
Protecting Yourself From the Sun    Apply an over-the-counter broad spectrum water resistant sunscreen with an SPF of at least 30 to exposed areas of the skin. Don’t forget the ears and lips! Remember to reapply sunscreen about every 2 hours and after swimming or sweating.     Wear sun protective clothing. Swim shirts (aka. rash guards) are a great idea and negates the need to reapply sunscreen in those areas.     Seek the shade whenever possible especially between the hours of 10 am and 4 pm when the sun’s rays are the strongest.     Avoid tanning beds

## 2024-03-08 ASSESSMENT — PATIENT HEALTH QUESTIONNAIRE - PHQ9
SUM OF ALL RESPONSES TO PHQ QUESTIONS 1-9: 0
SUM OF ALL RESPONSES TO PHQ QUESTIONS 1-9: 0
1. LITTLE INTEREST OR PLEASURE IN DOING THINGS: 0
1. LITTLE INTEREST OR PLEASURE IN DOING THINGS: NOT AT ALL
SUM OF ALL RESPONSES TO PHQ QUESTIONS 1-9: 0
SUM OF ALL RESPONSES TO PHQ9 QUESTIONS 1 & 2: 0
2. FEELING DOWN, DEPRESSED OR HOPELESS: NOT AT ALL
SUM OF ALL RESPONSES TO PHQ9 QUESTIONS 1 & 2: 0
SUM OF ALL RESPONSES TO PHQ QUESTIONS 1-9: 0
2. FEELING DOWN, DEPRESSED OR HOPELESS: 0

## 2024-03-11 ENCOUNTER — OFFICE VISIT (OUTPATIENT)
Dept: FAMILY MEDICINE CLINIC | Age: 39
End: 2024-03-11
Payer: COMMERCIAL

## 2024-03-11 VITALS
OXYGEN SATURATION: 99 % | HEART RATE: 76 BPM | SYSTOLIC BLOOD PRESSURE: 122 MMHG | HEIGHT: 64 IN | WEIGHT: 139 LBS | DIASTOLIC BLOOD PRESSURE: 66 MMHG | BODY MASS INDEX: 23.73 KG/M2

## 2024-03-11 DIAGNOSIS — Z00.00 ANNUAL PHYSICAL EXAM: Primary | ICD-10-CM

## 2024-03-11 PROBLEM — E04.1 LEFT THYROID NODULE: Status: RESOLVED | Noted: 2019-04-15 | Resolved: 2024-03-11

## 2024-03-11 PROCEDURE — 99396 PREV VISIT EST AGE 40-64: CPT | Performed by: FAMILY MEDICINE

## 2024-03-11 SDOH — ECONOMIC STABILITY: HOUSING INSECURITY
IN THE LAST 12 MONTHS, WAS THERE A TIME WHEN YOU DID NOT HAVE A STEADY PLACE TO SLEEP OR SLEPT IN A SHELTER (INCLUDING NOW)?: NO

## 2024-03-11 SDOH — ECONOMIC STABILITY: FOOD INSECURITY: WITHIN THE PAST 12 MONTHS, THE FOOD YOU BOUGHT JUST DIDN'T LAST AND YOU DIDN'T HAVE MONEY TO GET MORE.: NEVER TRUE

## 2024-03-11 SDOH — ECONOMIC STABILITY: INCOME INSECURITY: HOW HARD IS IT FOR YOU TO PAY FOR THE VERY BASICS LIKE FOOD, HOUSING, MEDICAL CARE, AND HEATING?: NOT HARD AT ALL

## 2024-03-11 SDOH — ECONOMIC STABILITY: FOOD INSECURITY: WITHIN THE PAST 12 MONTHS, YOU WORRIED THAT YOUR FOOD WOULD RUN OUT BEFORE YOU GOT MONEY TO BUY MORE.: NEVER TRUE

## 2024-03-11 NOTE — PROGRESS NOTES
Uncle     Cancer Maternal Grandmother 86        colon    High Blood Pressure Maternal Grandmother     High Cholesterol Maternal Grandmother     Diabetes Maternal Grandmother     Parkinsonism Maternal Grandmother     Other Mother         mental illness    Cancer Maternal Grandfather         BCC & SCC per PT         Health Maintenance   Topic Date Due    Hepatitis B vaccine (2 of 3 - 19+ 3-dose series) 03/11/2025 (Originally 9/24/2010)    COVID-19 Vaccine (4 - 2023-24 season) 03/11/2025 (Originally 9/1/2023)    Depression Screen  03/08/2025    Cervical cancer screen  11/13/2025    DTaP/Tdap/Td vaccine (3 - Td or Tdap) 04/07/2026    Lipids  08/28/2028    Flu vaccine  Completed    Hepatitis C screen  Completed    HIV screen  Completed    Hepatitis A vaccine  Aged Out    Hib vaccine  Aged Out    HPV vaccine  Aged Out    Polio vaccine  Aged Out    Meningococcal (ACWY) vaccine  Aged Out    Pneumococcal 0-64 years Vaccine  Aged Out    Varicella vaccine  Discontinued         Review Of Systems:  Skin: no changing moles, abnormal pigmentation, rash, scaling, itching, masses, hair or nail changes  Eyes: no blurring, diplopia, or eye pain  Ears/Nose/Throat: no hearing loss, tinnitus, vertigo, nosebleed, nasal congestion, rhinorrhea, sore throat  Respiratory: no cough, pleuritic chest pain, dyspnea, or wheezing  Cardiovascular: no angina, PANCHAL, orthopnea, PND, palpitations, or claudication  Gastrointestinal: no nausea, vomiting, heartburn, diarrhea, constipation, bloating, or abdominal pain  Genitourinary: no urinary urgency, frequency, dysuria, nocturia, hesitancy, or incontinence  Musculoskeletal: no arthritis, arthralgia, myalgia, weakness, or morning stiffness  Neurologic: no paralysis, paresis, paresthesia, seizures, tremors, or headaches  Hematologic/Lymphatic/Immunologic: no anemia, abnormal bleeding/bruising, fever, chills, night sweats, swollen glands, or unexplained weight loss  Endocrine: no heat or cold intolerance and

## 2024-03-12 ENCOUNTER — HOSPITAL ENCOUNTER (OUTPATIENT)
Dept: MAMMOGRAPHY | Age: 39
Discharge: HOME | End: 2024-03-12
Payer: COMMERCIAL

## 2024-03-12 DIAGNOSIS — Z00.00 ANNUAL PHYSICAL EXAM: ICD-10-CM

## 2024-03-12 PROCEDURE — 77063 BREAST TOMOSYNTHESIS BI: CPT

## 2024-03-14 ENCOUNTER — PATIENT MESSAGE (OUTPATIENT)
Dept: FAMILY MEDICINE CLINIC | Age: 39
End: 2024-03-14

## 2024-03-22 DIAGNOSIS — Z00.00 ANNUAL PHYSICAL EXAM: ICD-10-CM

## 2024-03-22 LAB
BASOPHILS # BLD: 0.1 K/UL (ref 0–0.2)
BASOPHILS NFR BLD: 0.9 %
DEPRECATED RDW RBC AUTO: 13.4 % (ref 12.4–15.4)
EOSINOPHIL # BLD: 0.4 K/UL (ref 0–0.6)
EOSINOPHIL NFR BLD: 4.6 %
HCT VFR BLD AUTO: 40.7 % (ref 36–48)
HGB BLD-MCNC: 13.9 G/DL (ref 12–16)
LYMPHOCYTES # BLD: 1.7 K/UL (ref 1–5.1)
LYMPHOCYTES NFR BLD: 19.7 %
MCH RBC QN AUTO: 31.6 PG (ref 26–34)
MCHC RBC AUTO-ENTMCNC: 34.2 G/DL (ref 31–36)
MCV RBC AUTO: 92.3 FL (ref 80–100)
MONOCYTES # BLD: 0.6 K/UL (ref 0–1.3)
MONOCYTES NFR BLD: 6.8 %
NEUTROPHILS # BLD: 5.8 K/UL (ref 1.7–7.7)
NEUTROPHILS NFR BLD: 68 %
PLATELET # BLD AUTO: 309 K/UL (ref 135–450)
PMV BLD AUTO: 8.4 FL (ref 5–10.5)
RBC # BLD AUTO: 4.41 M/UL (ref 4–5.2)
WBC # BLD AUTO: 8.5 K/UL (ref 4–11)

## 2024-03-23 LAB
ALBUMIN SERPL-MCNC: 4.8 G/DL (ref 3.4–5)
ALBUMIN/GLOB SERPL: 2 {RATIO} (ref 1.1–2.2)
ALP SERPL-CCNC: 76 U/L (ref 40–129)
ALT SERPL-CCNC: 11 U/L (ref 10–40)
ANION GAP SERPL CALCULATED.3IONS-SCNC: 12 MMOL/L (ref 3–16)
AST SERPL-CCNC: 14 U/L (ref 15–37)
BILIRUB SERPL-MCNC: 0.7 MG/DL (ref 0–1)
BUN SERPL-MCNC: 12 MG/DL (ref 7–20)
CALCIUM SERPL-MCNC: 9.5 MG/DL (ref 8.3–10.6)
CHLORIDE SERPL-SCNC: 102 MMOL/L (ref 99–110)
CHOLEST SERPL-MCNC: 138 MG/DL (ref 0–199)
CO2 SERPL-SCNC: 26 MMOL/L (ref 21–32)
CREAT SERPL-MCNC: 0.8 MG/DL (ref 0.6–1.1)
GFR SERPLBLD CREATININE-BSD FMLA CKD-EPI: >60 ML/MIN/{1.73_M2}
GLUCOSE SERPL-MCNC: 87 MG/DL (ref 70–99)
HDLC SERPL-MCNC: 58 MG/DL (ref 40–60)
LDLC SERPL CALC-MCNC: 68 MG/DL
POTASSIUM SERPL-SCNC: 4.3 MMOL/L (ref 3.5–5.1)
PROT SERPL-MCNC: 7.2 G/DL (ref 6.4–8.2)
SODIUM SERPL-SCNC: 140 MMOL/L (ref 136–145)
TRIGL SERPL-MCNC: 60 MG/DL (ref 0–150)
TSH SERPL DL<=0.005 MIU/L-ACNC: 2.05 UIU/ML (ref 0.27–4.2)
VLDLC SERPL CALC-MCNC: 12 MG/DL

## 2024-04-12 ENCOUNTER — HOSPITAL ENCOUNTER (OUTPATIENT)
Dept: ULTRASOUND IMAGING | Age: 39
Discharge: HOME | End: 2024-04-12
Payer: COMMERCIAL

## 2024-04-12 DIAGNOSIS — R92.30 DENSE BREAST TISSUE: ICD-10-CM

## 2024-04-12 PROCEDURE — 76641 ULTRASOUND BREAST COMPLETE: CPT

## 2024-04-17 ENCOUNTER — OFFICE VISIT (OUTPATIENT)
Dept: GYNECOLOGY | Age: 39
End: 2024-04-17
Payer: COMMERCIAL

## 2024-04-17 VITALS
HEIGHT: 64 IN | WEIGHT: 136.2 LBS | DIASTOLIC BLOOD PRESSURE: 74 MMHG | OXYGEN SATURATION: 99 % | HEART RATE: 85 BPM | RESPIRATION RATE: 17 BRPM | BODY MASS INDEX: 23.25 KG/M2 | SYSTOLIC BLOOD PRESSURE: 120 MMHG

## 2024-04-17 DIAGNOSIS — Z01.419 WELL WOMAN EXAM WITH ROUTINE GYNECOLOGICAL EXAM: Primary | ICD-10-CM

## 2024-04-17 DIAGNOSIS — R92.342 MAMMOGRAPHIC EXTREME DENSITY, LEFT BREAST: ICD-10-CM

## 2024-04-17 PROCEDURE — 99386 PREV VISIT NEW AGE 40-64: CPT | Performed by: OBSTETRICS & GYNECOLOGY

## 2024-04-17 RX ORDER — LEVONORGESTREL 52 MG/1
1 INTRAUTERINE DEVICE INTRAUTERINE ONCE
COMMUNITY

## 2024-04-19 ASSESSMENT — ENCOUNTER SYMPTOMS
GASTROINTESTINAL NEGATIVE: 1
ALLERGIC/IMMUNOLOGIC NEGATIVE: 1
RESPIRATORY NEGATIVE: 1
EYES NEGATIVE: 1

## 2024-04-20 NOTE — PROGRESS NOTES
Subjective   Patient ID: Carrie French is a 39 y.o. female.    Patient is here for annual.         Review of Systems   Constitutional: Negative.    HENT: Negative.     Eyes: Negative.    Respiratory: Negative.     Cardiovascular: Negative.    Gastrointestinal: Negative.    Endocrine: Negative.    Genitourinary: Negative.    Musculoskeletal: Negative.    Skin: Negative.    Allergic/Immunologic: Negative.    Neurological: Negative.    Hematological: Negative.    Psychiatric/Behavioral: Negative.       Date of Birth 1985  Past Medical History:   Diagnosis Date    Breast mass     Fibrocystic breast     Heart abnormalities age 6    benign heart murmur    Left thyroid nodule 04/15/2019    Thyroid nodule 04/15/2019    Varicosities     vulvar variscosities     Past Surgical History:   Procedure Laterality Date    CHG US GUIDANCE NEEDLE PLACEMENT IMG S&I  2019    benign    THYROIDECTOMY Left 2021    LEFT HEMITHYROIDECTOMY performed by Yogesh Mendoza MD at [facility] OR    WISDOM TOOTH EXTRACTION  age 14     OB History    Para Term  AB Living   4 4 4     4   SAB IAB Ectopic Molar Multiple Live Births             4      # Outcome Date GA Lbr Ousmane/2nd Weight Sex Delivery Anes PTL Lv   4 Term 06/10/16 40w3d  3.359 kg (7 lb 6.5 oz) F Vag-Spont  N HUBERT   3 Term 13 39w4d 06:32 3.51 kg (7 lb 11.8 oz) F Vag-Spont      2 Term 08/26/10 39w2d 07:39  F Vag-Spont   HUBERT   1 Term 08    M Vag-Spont EPI  HUBERT     Social History     Socioeconomic History    Marital status:      Spouse name: Not on file    Number of children: 3    Years of education: Not on file    Highest education level: Not on file   Occupational History    Occupation: Nurse     Comment: [employer]   Tobacco Use    Smoking status: Never    Smokeless tobacco: Never   Vaping Use    Vaping Use: Never used   Substance and Sexual Activity    Alcohol use: Yes     Comment: occ    Drug use: No    Sexual activity: Yes     Partners:

## 2024-04-25 ENCOUNTER — HOSPITAL ENCOUNTER (OUTPATIENT)
Dept: ULTRASOUND IMAGING | Age: 39
Discharge: HOME | End: 2024-04-25
Payer: COMMERCIAL

## 2024-04-25 ENCOUNTER — HOSPITAL ENCOUNTER (OUTPATIENT)
Dept: WOMENS IMAGING | Age: 39
Discharge: HOME | End: 2024-04-25
Payer: COMMERCIAL

## 2024-04-25 VITALS — HEIGHT: 64 IN | WEIGHT: 136 LBS | BODY MASS INDEX: 23.22 KG/M2

## 2024-04-25 DIAGNOSIS — R92.342 MAMMOGRAPHIC EXTREME DENSITY, LEFT BREAST: ICD-10-CM

## 2024-04-25 PROCEDURE — G0279 TOMOSYNTHESIS, MAMMO: HCPCS

## 2024-04-25 PROCEDURE — 76642 ULTRASOUND BREAST LIMITED: CPT

## 2024-04-26 ENCOUNTER — TELEPHONE (OUTPATIENT)
Dept: GYNECOLOGY | Age: 39
End: 2024-04-26

## 2024-04-26 DIAGNOSIS — N63.20 MASS OF LEFT BREAST, UNSPECIFIED QUADRANT: Primary | ICD-10-CM

## 2024-04-26 NOTE — TELEPHONE ENCOUNTER
Called and spoke to patient about her mammogram results. What is the way the order should be put into system patient has already scheduled to have this ultrasound guided biopsy done.

## 2024-05-09 ENCOUNTER — HOSPITAL ENCOUNTER (OUTPATIENT)
Dept: ULTRASOUND IMAGING | Age: 39
Discharge: HOME | End: 2024-05-09
Payer: COMMERCIAL

## 2024-05-09 ENCOUNTER — HOSPITAL ENCOUNTER (OUTPATIENT)
Dept: MAMMOGRAPHY | Age: 39
Discharge: HOME | End: 2024-05-09
Payer: COMMERCIAL

## 2024-05-09 DIAGNOSIS — N63.20 MASS OF LEFT BREAST, UNSPECIFIED QUADRANT: ICD-10-CM

## 2024-05-09 PROCEDURE — 19083 BX BREAST 1ST LESION US IMAG: CPT

## 2024-05-09 PROCEDURE — 77065 DX MAMMO INCL CAD UNI: CPT

## 2024-05-09 PROCEDURE — 88305 TISSUE EXAM BY PATHOLOGIST: CPT

## 2024-05-17 DIAGNOSIS — D24.9 FIBROADENOMA OF BREAST, UNSPECIFIED LATERALITY: Primary | ICD-10-CM

## 2024-05-30 LAB
CHOLEST SERPL-MCNC: 122 MG/DL (ref 0–199)
GLUCOSE SERPL-MCNC: 82 MG/DL (ref 70–99)
HDLC SERPL-MCNC: 54 MG/DL (ref 40–60)
LDLC SERPL CALC-MCNC: 58 MG/DL
TRIGL SERPL-MCNC: 51 MG/DL (ref 0–150)

## 2024-07-30 ENCOUNTER — TELEPHONE (OUTPATIENT)
Dept: GYNECOLOGY | Age: 39
End: 2024-07-30

## 2024-07-30 NOTE — TELEPHONE ENCOUNTER
Called and spoke to patient while talking to patient there was a disconnect on line so I hung up and called her back and left a detail message   in letting her know that her Mirena IUD has arrived here at our office. A message will be put into Dr Ziegler as to when we can have her come into office to have her IUD inserted.  Dr Ziegler is out of office for the rest of this week but a message will be put into Dr Ziegler. We will contact her once we hear from Dr Ziegler

## 2024-09-27 ENCOUNTER — TELEPHONE (OUTPATIENT)
Dept: GYNECOLOGY | Age: 39
End: 2024-09-27

## 2024-09-27 NOTE — TELEPHONE ENCOUNTER
Patient called office stating she had order placed for IUD back in July and needs to be resubmitted due to pt insurance change. Patient now has UMR    Patient can be reached at [phone]

## (undated) DEVICE — SUTURE VCRL SZ 3-0 L18IN ABSRB UD L26MM SH 1/2 CIR J864D

## (undated) DEVICE — SUTURE ETHLN SZ 4-0 L18IN NONABSORBABLE BLK L19MM PS-2 3/8 1667H

## (undated) DEVICE — GLOVE ORANGE PI 7 1/2   MSG9075

## (undated) DEVICE — HEAD & NECK: Brand: MEDLINE INDUSTRIES, INC.

## (undated) DEVICE — STAPLER SKIN H3.9MM WIRE DIA0.58MM CRWN 6.9MM 35 STPL ROT

## (undated) DEVICE — AGENT HEMSTAT W2XL14IN OXIDIZED REGENERATED CELOS ABSRB FOR

## (undated) DEVICE — ENDOTRACH TUBE 8229306 NIM EMG 6MM ROHS: Brand: NIM®

## (undated) DEVICE — SYRINGE MED 10ML LUERLOCK TIP W/O SFTY DISP

## (undated) DEVICE — BLANKET WRM W40.2XL55.9IN IORT LO BODY + MISTRAL AIR

## (undated) DEVICE — SPONGE,PEANUT,XRAY,ST,SM,3/8",5/CARD: Brand: MEDLINE INDUSTRIES, INC.

## (undated) DEVICE — SUTURE MCRYL SZ 5-0 L18IN ABSRB UD L13MM P-3 3/8 CIR PRIM Y493G

## (undated) DEVICE — AGENT HEMOSTATIC SURGIFLOW MATRIX KIT W/THROMBIN

## (undated) DEVICE — PENCIL ES ULT VAC W TELSCP NOSE EZ CLN BLDE 10FT

## (undated) DEVICE — SUTURE PERMA-HAND SZ 3-0 L144IN NONABSORBABLE BLK LIGAPAK LA54G

## (undated) DEVICE — PROBE 8225101 5PK STD PRASS FL TIP ROHS

## (undated) DEVICE — FLUID TRAP FOR MINIVAC ES EQUIP FLD TRAP

## (undated) DEVICE — DRAPE,INSTRUMENT,MAGNETIC,10X16: Brand: MEDLINE